# Patient Record
Sex: FEMALE | Race: WHITE | NOT HISPANIC OR LATINO
[De-identification: names, ages, dates, MRNs, and addresses within clinical notes are randomized per-mention and may not be internally consistent; named-entity substitution may affect disease eponyms.]

---

## 2017-01-03 ENCOUNTER — APPOINTMENT (OUTPATIENT)
Dept: FAMILY MEDICINE | Facility: CLINIC | Age: 67
End: 2017-01-03

## 2017-01-03 ENCOUNTER — OTHER (OUTPATIENT)
Age: 67
End: 2017-01-03

## 2017-01-10 ENCOUNTER — RESULT REVIEW (OUTPATIENT)
Age: 67
End: 2017-01-10

## 2017-01-10 LAB — FERRITIN SERPL-MCNC: 248 NG/ML

## 2017-03-08 ENCOUNTER — APPOINTMENT (OUTPATIENT)
Dept: FAMILY MEDICINE | Facility: CLINIC | Age: 67
End: 2017-03-08

## 2017-03-08 VITALS
BODY MASS INDEX: 27.6 KG/M2 | OXYGEN SATURATION: 98 % | WEIGHT: 150 LBS | SYSTOLIC BLOOD PRESSURE: 120 MMHG | HEIGHT: 62 IN | RESPIRATION RATE: 15 BRPM | TEMPERATURE: 98.9 F | DIASTOLIC BLOOD PRESSURE: 76 MMHG | HEART RATE: 74 BPM

## 2017-03-13 ENCOUNTER — APPOINTMENT (OUTPATIENT)
Dept: FAMILY MEDICINE | Facility: CLINIC | Age: 67
End: 2017-03-13

## 2017-03-13 VITALS
HEIGHT: 62 IN | RESPIRATION RATE: 20 BRPM | BODY MASS INDEX: 27.97 KG/M2 | HEART RATE: 76 BPM | SYSTOLIC BLOOD PRESSURE: 124 MMHG | DIASTOLIC BLOOD PRESSURE: 78 MMHG | WEIGHT: 152 LBS

## 2017-03-13 DIAGNOSIS — Z98.86 PERSONAL HISTORY OF BREAST IMPLANT REMOVAL: ICD-10-CM

## 2017-03-13 LAB
APPEARANCE: CLEAR
BACTERIA UR CULT: ABNORMAL
BACTERIA: ABNORMAL
BILIRUBIN URINE: NEGATIVE
BLOOD URINE: NEGATIVE
COLOR: YELLOW
GLUCOSE QUALITATIVE U: NORMAL MG/DL
HYALINE CASTS: 1 /LPF
KETONES URINE: NEGATIVE
LEUKOCYTE ESTERASE URINE: ABNORMAL
MICROSCOPIC-UA: NORMAL
NITRITE URINE: POSITIVE
PH URINE: 6
PROTEIN URINE: NEGATIVE MG/DL
RED BLOOD CELLS URINE: 2 /HPF
SPECIFIC GRAVITY URINE: 1.01
SQUAMOUS EPITHELIAL CELLS: 2 /HPF
UROBILINOGEN URINE: NORMAL MG/DL
WHITE BLOOD CELLS URINE: 6 /HPF

## 2017-03-13 RX ORDER — OXYCODONE AND ACETAMINOPHEN 5; 325 MG/1; MG/1
5-325 TABLET ORAL
Qty: 30 | Refills: 0 | Status: DISCONTINUED | COMMUNITY
Start: 2017-03-07

## 2017-03-13 RX ORDER — NITROFURANTOIN (MONOHYDRATE/MACROCRYSTALS) 25; 75 MG/1; MG/1
100 CAPSULE ORAL
Qty: 14 | Refills: 0 | Status: DISCONTINUED | COMMUNITY
Start: 2017-03-08 | End: 2017-03-13

## 2017-03-13 RX ORDER — CEPHALEXIN 500 MG/1
500 CAPSULE ORAL
Qty: 28 | Refills: 0 | Status: DISCONTINUED | COMMUNITY
Start: 2017-03-07

## 2017-03-13 RX ORDER — ONDANSETRON 4 MG/1
4 TABLET ORAL
Qty: 6 | Refills: 0 | Status: DISCONTINUED | COMMUNITY
Start: 2017-03-07

## 2017-03-16 LAB
ALBUMIN SERPL ELPH-MCNC: 4.2 G/DL
ALP BLD-CCNC: 50 U/L
ALT SERPL-CCNC: 8 U/L
ANION GAP SERPL CALC-SCNC: 20 MMOL/L
AST SERPL-CCNC: 16 U/L
BASOPHILS # BLD AUTO: 0.05 K/UL
BASOPHILS NFR BLD AUTO: 0.7 %
BILIRUB SERPL-MCNC: 0.2 MG/DL
BUN SERPL-MCNC: 14 MG/DL
CALCIUM SERPL-MCNC: 9.8 MG/DL
CHLORIDE SERPL-SCNC: 100 MMOL/L
CO2 SERPL-SCNC: 24 MMOL/L
CREAT SERPL-MCNC: 0.69 MG/DL
EOSINOPHIL # BLD AUTO: 0.2 K/UL
EOSINOPHIL NFR BLD AUTO: 3 %
GLUCOSE SERPL-MCNC: 92 MG/DL
HCT VFR BLD CALC: 40.3 %
HGB BLD-MCNC: 12.9 G/DL
IMM GRANULOCYTES NFR BLD AUTO: 0.3 %
LYMPHOCYTES # BLD AUTO: 1.75 K/UL
LYMPHOCYTES NFR BLD AUTO: 26.2 %
MAN DIFF?: NORMAL
MCHC RBC-ENTMCNC: 29.7 PG
MCHC RBC-ENTMCNC: 32 GM/DL
MCV RBC AUTO: 92.9 FL
MONOCYTES # BLD AUTO: 0.61 K/UL
MONOCYTES NFR BLD AUTO: 9.1 %
NEUTROPHILS # BLD AUTO: 4.06 K/UL
NEUTROPHILS NFR BLD AUTO: 60.7 %
PLATELET # BLD AUTO: 274 K/UL
POTASSIUM SERPL-SCNC: 4.7 MMOL/L
PROT SERPL-MCNC: 6.8 G/DL
RBC # BLD: 4.34 M/UL
RBC # FLD: 14 %
SODIUM SERPL-SCNC: 143 MMOL/L
WBC # FLD AUTO: 6.69 K/UL

## 2017-08-28 ENCOUNTER — APPOINTMENT (OUTPATIENT)
Dept: FAMILY MEDICINE | Facility: CLINIC | Age: 67
End: 2017-08-28
Payer: MEDICARE

## 2017-08-28 VITALS
HEIGHT: 62 IN | BODY MASS INDEX: 27.42 KG/M2 | RESPIRATION RATE: 20 BRPM | WEIGHT: 149 LBS | HEART RATE: 68 BPM | DIASTOLIC BLOOD PRESSURE: 65 MMHG | SYSTOLIC BLOOD PRESSURE: 122 MMHG

## 2017-08-28 PROCEDURE — 99213 OFFICE O/P EST LOW 20 MIN: CPT | Mod: 25

## 2017-08-28 PROCEDURE — 36415 COLL VENOUS BLD VENIPUNCTURE: CPT

## 2017-09-04 LAB
ALBUMIN SERPL ELPH-MCNC: 4.3 G/DL
ALP BLD-CCNC: 56 U/L
ALT SERPL-CCNC: 8 U/L
ANION GAP SERPL CALC-SCNC: 18 MMOL/L
APPEARANCE: CLEAR
AST SERPL-CCNC: 13 U/L
BASOPHILS # BLD AUTO: 0.04 K/UL
BASOPHILS NFR BLD AUTO: 0.6 %
BILIRUB SERPL-MCNC: 0.4 MG/DL
BILIRUBIN URINE: NEGATIVE
BLOOD URINE: NEGATIVE
BUN SERPL-MCNC: 9 MG/DL
CALCIUM SERPL-MCNC: 10.1 MG/DL
CHLORIDE SERPL-SCNC: 101 MMOL/L
CHOLEST SERPL-MCNC: 234 MG/DL
CHOLEST/HDLC SERPL: 2.8 RATIO
CO2 SERPL-SCNC: 23 MMOL/L
COLOR: YELLOW
CREAT SERPL-MCNC: 0.75 MG/DL
EOSINOPHIL # BLD AUTO: 0.29 K/UL
EOSINOPHIL NFR BLD AUTO: 4.1 %
FERRITIN SERPL-MCNC: 245 NG/ML
GLUCOSE QUALITATIVE U: NORMAL MG/DL
GLUCOSE SERPL-MCNC: 97 MG/DL
HBA1C MFR BLD HPLC: 5.6 %
HCT VFR BLD CALC: 40.9 %
HDLC SERPL-MCNC: 84 MG/DL
HGB BLD-MCNC: 13 G/DL
IMM GRANULOCYTES NFR BLD AUTO: 0.1 %
KETONES URINE: NEGATIVE
LDLC SERPL CALC-MCNC: 126 MG/DL
LEUKOCYTE ESTERASE URINE: NEGATIVE
LYMPHOCYTES # BLD AUTO: 2.11 K/UL
LYMPHOCYTES NFR BLD AUTO: 30.1 %
MAN DIFF?: NORMAL
MCHC RBC-ENTMCNC: 29.5 PG
MCHC RBC-ENTMCNC: 31.8 GM/DL
MCV RBC AUTO: 93 FL
MONOCYTES # BLD AUTO: 0.6 K/UL
MONOCYTES NFR BLD AUTO: 8.5 %
NEUTROPHILS # BLD AUTO: 3.97 K/UL
NEUTROPHILS NFR BLD AUTO: 56.6 %
NITRITE URINE: NEGATIVE
PH URINE: 6
PLATELET # BLD AUTO: 273 K/UL
POTASSIUM SERPL-SCNC: 4.8 MMOL/L
PROT SERPL-MCNC: 7.2 G/DL
PROTEIN URINE: NEGATIVE MG/DL
RBC # BLD: 4.4 M/UL
RBC # FLD: 14.1 %
SODIUM SERPL-SCNC: 142 MMOL/L
SPECIFIC GRAVITY URINE: 1.01
T4 FREE SERPL-MCNC: 1.3 NG/DL
TRIGL SERPL-MCNC: 118 MG/DL
TSH SERPL-ACNC: 3.57 UIU/ML
UROBILINOGEN URINE: NORMAL MG/DL
WBC # FLD AUTO: 7.02 K/UL

## 2017-09-05 ENCOUNTER — RESULT REVIEW (OUTPATIENT)
Age: 67
End: 2017-09-05

## 2017-09-07 ENCOUNTER — RESULT REVIEW (OUTPATIENT)
Age: 67
End: 2017-09-07

## 2017-09-19 ENCOUNTER — APPOINTMENT (OUTPATIENT)
Dept: HEMATOLOGY ONCOLOGY | Facility: CLINIC | Age: 67
End: 2017-09-19
Payer: MEDICARE

## 2017-09-19 VITALS
HEART RATE: 68 BPM | TEMPERATURE: 98.6 F | WEIGHT: 150 LBS | HEIGHT: 61.5 IN | SYSTOLIC BLOOD PRESSURE: 128 MMHG | BODY MASS INDEX: 27.96 KG/M2 | DIASTOLIC BLOOD PRESSURE: 70 MMHG

## 2017-09-19 DIAGNOSIS — N39.0 URINARY TRACT INFECTION, SITE NOT SPECIFIED: ICD-10-CM

## 2017-09-19 DIAGNOSIS — Z87.81 PERSONAL HISTORY OF (HEALED) TRAUMATIC FRACTURE: ICD-10-CM

## 2017-09-19 DIAGNOSIS — Z87.898 PERSONAL HISTORY OF OTHER SPECIFIED CONDITIONS: ICD-10-CM

## 2017-09-19 DIAGNOSIS — Z98.890 OTHER SPECIFIED POSTPROCEDURAL STATES: ICD-10-CM

## 2017-09-19 DIAGNOSIS — Z78.9 OTHER SPECIFIED HEALTH STATUS: ICD-10-CM

## 2017-09-19 DIAGNOSIS — Z87.09 PERSONAL HISTORY OF OTHER DISEASES OF THE RESPIRATORY SYSTEM: ICD-10-CM

## 2017-09-19 PROCEDURE — 99205 OFFICE O/P NEW HI 60 MIN: CPT

## 2017-09-19 RX ORDER — BACILLUS COAGULANS/INULIN 1B-250 MG
CAPSULE ORAL
Refills: 0 | Status: ACTIVE | COMMUNITY

## 2017-09-19 RX ORDER — ASCORBIC ACID 500 MG
TABLET ORAL
Refills: 0 | Status: ACTIVE | COMMUNITY

## 2017-09-19 RX ORDER — BIOTIN 10 MG
TABLET ORAL
Refills: 0 | Status: ACTIVE | COMMUNITY

## 2017-09-21 ENCOUNTER — FORM ENCOUNTER (OUTPATIENT)
Age: 67
End: 2017-09-21

## 2017-09-22 ENCOUNTER — APPOINTMENT (OUTPATIENT)
Dept: ULTRASOUND IMAGING | Facility: HOSPITAL | Age: 67
End: 2017-09-22
Payer: MEDICARE

## 2017-09-22 ENCOUNTER — APPOINTMENT (OUTPATIENT)
Dept: RADIOLOGY | Facility: HOSPITAL | Age: 67
End: 2017-09-22
Payer: MEDICARE

## 2017-09-22 ENCOUNTER — OUTPATIENT (OUTPATIENT)
Dept: OUTPATIENT SERVICES | Facility: HOSPITAL | Age: 67
LOS: 1 days | End: 2017-09-22
Payer: MEDICARE

## 2017-09-22 LAB
ALBUMIN SERPL ELPH-MCNC: 4.2 G/DL
ALP BLD-CCNC: 53 U/L
ALT SERPL-CCNC: 11 U/L
APPEARANCE: CLEAR
AST SERPL-CCNC: 15 U/L
BASOPHILS # BLD AUTO: 0.04 K/UL
BASOPHILS NFR BLD AUTO: 0.7 %
BILIRUB DIRECT SERPL-MCNC: 0.1 MG/DL
BILIRUB INDIRECT SERPL-MCNC: 0.3 MG/DL
BILIRUB SERPL-MCNC: 0.4 MG/DL
BILIRUBIN URINE: NEGATIVE
BLOOD URINE: NEGATIVE
COLOR: YELLOW
CRP SERPL-MCNC: 1.1 MG/DL
EOSINOPHIL # BLD AUTO: 0.3 K/UL
EOSINOPHIL NFR BLD AUTO: 5.1 %
ERYTHROCYTE [SEDIMENTATION RATE] IN BLOOD BY WESTERGREN METHOD: 16 MM/HR
FERRITIN SERPL-MCNC: 260 NG/ML
GLUCOSE QUALITATIVE U: NORMAL MG/DL
HCT VFR BLD CALC: 38.5 %
HGB BLD-MCNC: 12.7 G/DL
IMM GRANULOCYTES NFR BLD AUTO: 0.3 %
IRON SATN MFR SERPL: 32 %
IRON SERPL-MCNC: 98 UG/DL
KETONES URINE: ABNORMAL
LEUKOCYTE ESTERASE URINE: NEGATIVE
LYMPHOCYTES # BLD AUTO: 1.76 K/UL
LYMPHOCYTES NFR BLD AUTO: 30 %
MAN DIFF?: NORMAL
MCHC RBC-ENTMCNC: 30.1 PG
MCHC RBC-ENTMCNC: 33 GM/DL
MCV RBC AUTO: 91.2 FL
MONOCYTES # BLD AUTO: 0.58 K/UL
MONOCYTES NFR BLD AUTO: 9.9 %
NEUTROPHILS # BLD AUTO: 3.17 K/UL
NEUTROPHILS NFR BLD AUTO: 54 %
NITRITE URINE: NEGATIVE
PH URINE: 6
PLATELET # BLD AUTO: 280 K/UL
PROT SERPL-MCNC: 7.5 G/DL
PROTEIN URINE: NEGATIVE MG/DL
RBC # BLD: 4.22 M/UL
RBC # FLD: 14.1 %
SPECIFIC GRAVITY URINE: 1.01
TIBC SERPL-MCNC: 310 UG/DL
UIBC SERPL-MCNC: 212 UG/DL
UROBILINOGEN URINE: NORMAL MG/DL
WBC # FLD AUTO: 5.87 K/UL

## 2017-09-22 PROCEDURE — 76700 US EXAM ABDOM COMPLETE: CPT | Mod: 26

## 2017-09-22 PROCEDURE — 76700 US EXAM ABDOM COMPLETE: CPT

## 2017-09-22 PROCEDURE — 71020: CPT | Mod: 26

## 2017-09-22 PROCEDURE — 71046 X-RAY EXAM CHEST 2 VIEWS: CPT

## 2017-09-23 ENCOUNTER — RESULT REVIEW (OUTPATIENT)
Age: 67
End: 2017-09-23

## 2017-09-23 LAB
ALBUMIN MFR SERPL ELPH: 57 %
ALBUMIN SERPL-MCNC: 4.3 G/DL
ALBUMIN/GLOB SERPL: 1.3 RATIO
ALPHA1 GLOB MFR SERPL ELPH: 4.7 %
ALPHA1 GLOB SERPL ELPH-MCNC: 0.4 G/DL
ALPHA2 GLOB MFR SERPL ELPH: 11.5 %
ALPHA2 GLOB SERPL ELPH-MCNC: 0.9 G/DL
B-GLOBULIN MFR SERPL ELPH: 11.7 %
B-GLOBULIN SERPL ELPH-MCNC: 0.9 G/DL
GAMMA GLOB FLD ELPH-MCNC: 1.1 G/DL
GAMMA GLOB MFR SERPL ELPH: 15.1 %
INTERPRETATION SERPL IEP-IMP: NORMAL
PROT SERPL-MCNC: 7.5 G/DL
PROT SERPL-MCNC: 7.5 G/DL

## 2017-09-27 ENCOUNTER — MESSAGE (OUTPATIENT)
Age: 67
End: 2017-09-27

## 2017-09-27 LAB
BACTERIA BLD CULT: NORMAL
TM INTERPRETATION: NORMAL

## 2017-10-17 ENCOUNTER — APPOINTMENT (OUTPATIENT)
Dept: HEMATOLOGY ONCOLOGY | Facility: CLINIC | Age: 67
End: 2017-10-17
Payer: MEDICARE

## 2017-10-17 VITALS
BODY MASS INDEX: 27.88 KG/M2 | DIASTOLIC BLOOD PRESSURE: 64 MMHG | HEART RATE: 68 BPM | SYSTOLIC BLOOD PRESSURE: 128 MMHG | RESPIRATION RATE: 18 BRPM | TEMPERATURE: 98.4 F | WEIGHT: 150 LBS

## 2017-10-17 PROCEDURE — 99214 OFFICE O/P EST MOD 30 MIN: CPT

## 2017-12-08 ENCOUNTER — OTHER (OUTPATIENT)
Age: 67
End: 2017-12-08

## 2018-01-16 ENCOUNTER — RESULT REVIEW (OUTPATIENT)
Age: 68
End: 2018-01-16

## 2018-01-16 LAB — HEMOCCULT STL QL IA: NEGATIVE

## 2018-02-01 ENCOUNTER — APPOINTMENT (OUTPATIENT)
Dept: FAMILY MEDICINE | Facility: CLINIC | Age: 68
End: 2018-02-01
Payer: MEDICARE

## 2018-02-01 VITALS
HEART RATE: 64 BPM | WEIGHT: 151 LBS | RESPIRATION RATE: 20 BRPM | DIASTOLIC BLOOD PRESSURE: 68 MMHG | BODY MASS INDEX: 28.14 KG/M2 | HEIGHT: 61.5 IN | SYSTOLIC BLOOD PRESSURE: 125 MMHG

## 2018-02-01 PROCEDURE — G0439: CPT

## 2018-02-01 PROCEDURE — 93000 ELECTROCARDIOGRAM COMPLETE: CPT | Mod: 59

## 2018-02-01 PROCEDURE — 36415 COLL VENOUS BLD VENIPUNCTURE: CPT

## 2018-02-02 LAB
ALBUMIN SERPL ELPH-MCNC: 4.1 G/DL
ALP BLD-CCNC: 57 U/L
ALT SERPL-CCNC: 13 U/L
ANION GAP SERPL CALC-SCNC: 11 MMOL/L
APPEARANCE: CLEAR
AST SERPL-CCNC: 15 U/L
BASOPHILS # BLD AUTO: 0.05 K/UL
BASOPHILS NFR BLD AUTO: 0.8 %
BILIRUB SERPL-MCNC: 0.4 MG/DL
BILIRUBIN URINE: NEGATIVE
BLOOD URINE: NEGATIVE
BUN SERPL-MCNC: 14 MG/DL
CALCIUM SERPL-MCNC: 9.6 MG/DL
CHLORIDE SERPL-SCNC: 103 MMOL/L
CHOLEST SERPL-MCNC: 207 MG/DL
CHOLEST/HDLC SERPL: 2.7 RATIO
CO2 SERPL-SCNC: 25 MMOL/L
COLOR: YELLOW
CREAT SERPL-MCNC: 0.75 MG/DL
EOSINOPHIL # BLD AUTO: 0.41 K/UL
EOSINOPHIL NFR BLD AUTO: 6.4 %
FERRITIN SERPL-MCNC: 127 NG/ML
GLUCOSE QUALITATIVE U: NEGATIVE MG/DL
GLUCOSE SERPL-MCNC: 96 MG/DL
HBA1C MFR BLD HPLC: 5.5 %
HCT VFR BLD CALC: 39.1 %
HCV AB SER QL: NONREACTIVE
HCV S/CO RATIO: 0.14 S/CO
HDLC SERPL-MCNC: 76 MG/DL
HGB BLD-MCNC: 12.7 G/DL
IMM GRANULOCYTES NFR BLD AUTO: 0.5 %
IRON SATN MFR SERPL: 30 %
IRON SERPL-MCNC: 91 UG/DL
KETONES URINE: NEGATIVE
LDLC SERPL CALC-MCNC: 110 MG/DL
LEUKOCYTE ESTERASE URINE: NEGATIVE
LYMPHOCYTES # BLD AUTO: 1.59 K/UL
LYMPHOCYTES NFR BLD AUTO: 25 %
MAN DIFF?: NORMAL
MCHC RBC-ENTMCNC: 29 PG
MCHC RBC-ENTMCNC: 32.5 GM/DL
MCV RBC AUTO: 89.3 FL
MONOCYTES # BLD AUTO: 0.77 K/UL
MONOCYTES NFR BLD AUTO: 12.1 %
NEUTROPHILS # BLD AUTO: 3.51 K/UL
NEUTROPHILS NFR BLD AUTO: 55.2 %
NITRITE URINE: NEGATIVE
PH URINE: 6
PLATELET # BLD AUTO: 327 K/UL
POTASSIUM SERPL-SCNC: 4.6 MMOL/L
PROT SERPL-MCNC: 7 G/DL
PROTEIN URINE: NEGATIVE MG/DL
RBC # BLD: 4.38 M/UL
RBC # FLD: 13.2 %
RHEUMATOID FACT SER QL: 9 IU/ML
SODIUM SERPL-SCNC: 139 MMOL/L
SPECIFIC GRAVITY URINE: 1.01
T4 FREE SERPL-MCNC: 1.2 NG/DL
TIBC SERPL-MCNC: 307 UG/DL
TRIGL SERPL-MCNC: 105 MG/DL
TSH SERPL-ACNC: 2.99 UIU/ML
UIBC SERPL-MCNC: 216 UG/DL
UROBILINOGEN URINE: NEGATIVE MG/DL
WBC # FLD AUTO: 6.36 K/UL

## 2018-02-05 LAB
ANA PAT FLD IF-IMP: ABNORMAL
ANA SER IF-ACNC: ABNORMAL

## 2018-04-11 LAB
ANA PAT FLD IF-IMP: ABNORMAL
ANA SER IF-ACNC: ABNORMAL

## 2018-04-17 ENCOUNTER — APPOINTMENT (OUTPATIENT)
Dept: HEMATOLOGY ONCOLOGY | Facility: CLINIC | Age: 68
End: 2018-04-17
Payer: MEDICARE

## 2018-04-17 VITALS
DIASTOLIC BLOOD PRESSURE: 80 MMHG | BODY MASS INDEX: 28.44 KG/M2 | WEIGHT: 153 LBS | HEART RATE: 76 BPM | SYSTOLIC BLOOD PRESSURE: 124 MMHG

## 2018-04-17 DIAGNOSIS — R76.8 OTHER SPECIFIED ABNORMAL IMMUNOLOGICAL FINDINGS IN SERUM: ICD-10-CM

## 2018-04-17 DIAGNOSIS — Z98.890 OTHER SPECIFIED POSTPROCEDURAL STATES: ICD-10-CM

## 2018-04-17 DIAGNOSIS — Z92.89 PERSONAL HISTORY OF OTHER MEDICAL TREATMENT: ICD-10-CM

## 2018-04-17 DIAGNOSIS — R79.89 OTHER SPECIFIED ABNORMAL FINDINGS OF BLOOD CHEMISTRY: ICD-10-CM

## 2018-04-17 PROCEDURE — 99214 OFFICE O/P EST MOD 30 MIN: CPT

## 2019-03-19 ENCOUNTER — APPOINTMENT (OUTPATIENT)
Dept: FAMILY MEDICINE | Facility: CLINIC | Age: 69
End: 2019-03-19
Payer: MEDICARE

## 2019-03-19 ENCOUNTER — LABORATORY RESULT (OUTPATIENT)
Age: 69
End: 2019-03-19

## 2019-03-19 VITALS
BODY MASS INDEX: 27.4 KG/M2 | RESPIRATION RATE: 20 BRPM | DIASTOLIC BLOOD PRESSURE: 70 MMHG | HEART RATE: 76 BPM | SYSTOLIC BLOOD PRESSURE: 115 MMHG | HEIGHT: 61.5 IN | WEIGHT: 147 LBS

## 2019-03-19 PROCEDURE — 36415 COLL VENOUS BLD VENIPUNCTURE: CPT

## 2019-03-19 PROCEDURE — 99397 PER PM REEVAL EST PAT 65+ YR: CPT | Mod: 25

## 2019-03-19 PROCEDURE — 93000 ELECTROCARDIOGRAM COMPLETE: CPT | Mod: 59

## 2019-03-19 NOTE — ASSESSMENT
[FreeTextEntry1] : Comprehensive exam reveals patient to be hemodynamically stable with acceptable BP\par No new issues identified\par Lab profiles sent

## 2019-03-19 NOTE — PHYSICAL EXAM
[No Acute Distress] : no acute distress [Well Nourished] : well nourished [Well Developed] : well developed [Well-Appearing] : well-appearing [Normal Sclera/Conjunctiva] : normal sclera/conjunctiva [PERRL] : pupils equal round and reactive to light [EOMI] : extraocular movements intact [Normal Outer Ear/Nose] : the outer ears and nose were normal in appearance [Normal Oropharynx] : the oropharynx was normal [Normal TMs] : both tympanic membranes were normal [Normal Nasal Mucosa] : the nasal mucosa was normal [No JVD] : no jugular venous distention [Supple] : supple [No Lymphadenopathy] : no lymphadenopathy [Thyroid Normal, No Nodules] : the thyroid was normal and there were no nodules present [No Respiratory Distress] : no respiratory distress  [Clear to Auscultation] : lungs were clear to auscultation bilaterally [Normal Rate] : normal rate  [No Accessory Muscle Use] : no accessory muscle use [Normal S1, S2] : normal S1 and S2 [Regular Rhythm] : with a regular rhythm [No Murmur] : no murmur heard [No Carotid Bruits] : no carotid bruits [No Abdominal Bruit] : a ~M bruit was not heard ~T in the abdomen [No Varicosities] : no varicosities [No Edema] : there was no peripheral edema [Pedal Pulses Present] : the pedal pulses are present [No Extremity Clubbing/Cyanosis] : no extremity clubbing/cyanosis [No Palpable Aorta] : no palpable aorta [Soft] : abdomen soft [Non Tender] : non-tender [Non-distended] : non-distended [No Masses] : no abdominal mass palpated [No HSM] : no HSM [Normal Bowel Sounds] : normal bowel sounds [Normal Posterior Cervical Nodes] : no posterior cervical lymphadenopathy [Normal Femoral Nodes] : no femoral lymphadenopathy [Normal Anterior Cervical Nodes] : no anterior cervical lymphadenopathy [Normal Inguinal Nodes] : no inguinal lymphadenopathy [No CVA Tenderness] : no CVA  tenderness [No Spinal Tenderness] : no spinal tenderness [Grossly Normal Strength/Tone] : grossly normal strength/tone [No Rash] : no rash [No Skin Lesions] : no skin lesions [Normal Gait] : normal gait [Coordination Grossly Intact] : coordination grossly intact [No Focal Deficits] : no focal deficits [Speech Grossly Normal] : speech grossly normal [Memory Grossly Normal] : memory grossly normal [Normal Affect] : the affect was normal [Normal Mood] : the mood was normal [Alert and Oriented x3] : oriented to person, place, and time [Normal Insight/Judgement] : insight and judgment were intact [de-identified] : mild congestion noted [de-identified] : defer to GYN [FreeTextEntry1] : defer to GYN [de-identified] : defer to GYN [de-identified] : multiple OA nodules noted

## 2019-03-19 NOTE — HEALTH RISK ASSESSMENT
[No falls in past year] : Patient reported no falls in the past year [0] : 2) Feeling down, depressed, or hopeless: Not at all (0) [Patient reported mammogram was normal] : Patient reported mammogram was normal [Patient reported PAP Smear was normal] : Patient reported PAP Smear was normal [Patient reported colonoscopy was normal] : Patient reported colonoscopy was normal [Fully functional (bathing, dressing, toileting, transferring, walking, feeding)] : Fully functional (bathing, dressing, toileting, transferring, walking, feeding) [Fully functional (using the telephone, shopping, preparing meals, housekeeping, doing laundry, using] : Fully functional and needs no help or supervision to perform IADLs (using the telephone, shopping, preparing meals, housekeeping, doing laundry, using transportation, managing medications and managing finances) [Reviewed no changes] : Reviewed no changes [] : No [MammogramDate] : 04/18 [PapSmearDate] : 04/18 [ColonoscopyDate] : 12/18 [AdvancecareDate] : 03/19

## 2019-03-19 NOTE — HISTORY OF PRESENT ILLNESS
[FreeTextEntry1] : Requests comprehensive exam\par  [de-identified] : Presents for comprehensive exam.  States feeling generally well; trying to watch diet and remain active.  Reviewed screening and immunizations - note declines vaccines.  Continues to follow with Rheum for arthropathy.

## 2019-03-20 LAB
ALBUMIN SERPL ELPH-MCNC: 4.4 G/DL
ALP BLD-CCNC: 48 U/L
ALT SERPL-CCNC: 9 U/L
ANION GAP SERPL CALC-SCNC: 12 MMOL/L
APPEARANCE: ABNORMAL
AST SERPL-CCNC: 14 U/L
BASOPHILS # BLD AUTO: 0.05 K/UL
BASOPHILS NFR BLD AUTO: 0.8 %
BILIRUB SERPL-MCNC: 0.4 MG/DL
BILIRUBIN URINE: NEGATIVE
BLOOD URINE: NEGATIVE
BUN SERPL-MCNC: 11 MG/DL
CALCIUM SERPL-MCNC: 9.6 MG/DL
CHLORIDE SERPL-SCNC: 104 MMOL/L
CHOLEST SERPL-MCNC: 207 MG/DL
CHOLEST/HDLC SERPL: 2.7 RATIO
CO2 SERPL-SCNC: 23 MMOL/L
COLOR: YELLOW
CREAT SERPL-MCNC: 0.69 MG/DL
EOSINOPHIL # BLD AUTO: 0.24 K/UL
EOSINOPHIL NFR BLD AUTO: 3.9 %
FERRITIN SERPL-MCNC: 166 NG/ML
FOLATE SERPL-MCNC: >20 NG/ML
GLUCOSE QUALITATIVE U: NEGATIVE
GLUCOSE SERPL-MCNC: 94 MG/DL
HBA1C MFR BLD HPLC: 5.5 %
HCT VFR BLD CALC: 40.4 %
HDLC SERPL-MCNC: 78 MG/DL
HGB BLD-MCNC: 12.8 G/DL
IMM GRANULOCYTES NFR BLD AUTO: 0.3 %
KETONES URINE: NORMAL
LDLC SERPL CALC-MCNC: 106 MG/DL
LEUKOCYTE ESTERASE URINE: NEGATIVE
LYMPHOCYTES # BLD AUTO: 1.24 K/UL
LYMPHOCYTES NFR BLD AUTO: 20.2 %
MAN DIFF?: NORMAL
MCHC RBC-ENTMCNC: 29.4 PG
MCHC RBC-ENTMCNC: 31.7 GM/DL
MCV RBC AUTO: 92.9 FL
MONOCYTES # BLD AUTO: 0.52 K/UL
MONOCYTES NFR BLD AUTO: 8.5 %
NEUTROPHILS # BLD AUTO: 4.08 K/UL
NEUTROPHILS NFR BLD AUTO: 66.3 %
NITRITE URINE: NEGATIVE
PH URINE: 5.5
PLATELET # BLD AUTO: 276 K/UL
POTASSIUM SERPL-SCNC: 4.8 MMOL/L
PROT SERPL-MCNC: 6.9 G/DL
PROTEIN URINE: NEGATIVE
RBC # BLD: 4.35 M/UL
RBC # FLD: 13.6 %
SODIUM SERPL-SCNC: 139 MMOL/L
SPECIFIC GRAVITY URINE: 1.02
T4 FREE SERPL-MCNC: 1.3 NG/DL
TRIGL SERPL-MCNC: 113 MG/DL
TSH SERPL-ACNC: 2.77 UIU/ML
UROBILINOGEN URINE: NORMAL
VIT B12 SERPL-MCNC: 569 PG/ML
WBC # FLD AUTO: 6.15 K/UL

## 2020-02-07 ENCOUNTER — APPOINTMENT (OUTPATIENT)
Dept: FAMILY MEDICINE | Facility: CLINIC | Age: 70
End: 2020-02-07
Payer: MEDICARE

## 2020-02-07 VITALS
HEIGHT: 61.5 IN | TEMPERATURE: 97.8 F | HEART RATE: 59 BPM | WEIGHT: 155 LBS | DIASTOLIC BLOOD PRESSURE: 60 MMHG | BODY MASS INDEX: 28.89 KG/M2 | SYSTOLIC BLOOD PRESSURE: 120 MMHG | OXYGEN SATURATION: 95 %

## 2020-02-07 DIAGNOSIS — Z87.09 PERSONAL HISTORY OF OTHER DISEASES OF THE RESPIRATORY SYSTEM: ICD-10-CM

## 2020-02-07 PROCEDURE — 99213 OFFICE O/P EST LOW 20 MIN: CPT

## 2020-02-07 RX ORDER — AMOXICILLIN AND CLAVULANATE POTASSIUM 875; 125 MG/1; MG/1
875-125 TABLET, COATED ORAL
Qty: 14 | Refills: 0 | Status: COMPLETED | COMMUNITY
Start: 2020-02-03 | End: 2020-02-07

## 2020-02-07 NOTE — PHYSICAL EXAM
[Normal] : affect was normal and insight and judgment were intact [de-identified] : Grossly clear breath sounds

## 2020-02-07 NOTE — REVIEW OF SYSTEMS
[Fatigue] : fatigue [Wheezing] : wheezing [Cough] : cough [Dyspnea on Exertion] : dyspnea on exertion [Negative] : Psychiatric

## 2020-02-07 NOTE — HISTORY OF PRESENT ILLNESS
[de-identified] : Dry hacking cough since this past Sunday. On Sunday and Monday she did note a significant fever (102) and fatigue that resolved. She notes mild Spencer, wheezing and chest pain with cough. Overall all symptoms are improving. She was prescribed course of Augementin by PCP Dr. Bautista and she started this Tuesday \par \par Has taken Dayquil, Nyquil, and Advil with mild relief of symptoms. \par \par Denies flu shot. \par Denies sob or \par \par \par Has developed bronchitis

## 2020-03-24 ENCOUNTER — APPOINTMENT (OUTPATIENT)
Dept: FAMILY MEDICINE | Facility: CLINIC | Age: 70
End: 2020-03-24

## 2020-05-05 ENCOUNTER — APPOINTMENT (OUTPATIENT)
Dept: FAMILY MEDICINE | Facility: CLINIC | Age: 70
End: 2020-05-05
Payer: MEDICARE

## 2020-05-05 VITALS
WEIGHT: 158 LBS | HEART RATE: 68 BPM | HEIGHT: 61.5 IN | BODY MASS INDEX: 29.45 KG/M2 | SYSTOLIC BLOOD PRESSURE: 118 MMHG | RESPIRATION RATE: 20 BRPM | DIASTOLIC BLOOD PRESSURE: 70 MMHG

## 2020-05-05 PROCEDURE — G0439: CPT | Mod: 25

## 2020-05-05 PROCEDURE — 93000 ELECTROCARDIOGRAM COMPLETE: CPT

## 2020-05-05 PROCEDURE — 36415 COLL VENOUS BLD VENIPUNCTURE: CPT

## 2020-05-05 NOTE — ASSESSMENT
[FreeTextEntry1] : Comprehensive exam reveals patient to be hemodynamically stable with acceptable BP\par No new issues identified\par Lab profiles drawn in office and sent

## 2020-05-05 NOTE — PHYSICAL EXAM
[Normal Anterior Cervical Nodes] : no anterior cervical lymphadenopathy [Normal Posterior Cervical Nodes] : no posterior cervical lymphadenopathy [Normal Inguinal Nodes] : no inguinal lymphadenopathy [Normal Femoral Nodes] : no femoral lymphadenopathy [Coordination Grossly Intact] : coordination grossly intact [Normal] : no rash [No Focal Deficits] : no focal deficits [Normal Gait] : normal gait [Speech Grossly Normal] : speech grossly normal [Memory Grossly Normal] : memory grossly normal [Alert and Oriented x3] : oriented to person, place, and time [Normal Affect] : the affect was normal [Normal Mood] : the mood was normal [Normal Insight/Judgement] : insight and judgment were intact [FreeTextEntry1] : defer to GYN [de-identified] : defer to GYN [de-identified] : defer to GYN

## 2020-05-05 NOTE — COUNSELING
[de-identified] : Healthy eating and activities [None] : None [Good understanding] : Patient has a good understanding of lifestyle changes and steps needed to achieve self management goal

## 2020-05-05 NOTE — HEALTH RISK ASSESSMENT
[Never (0 pts)] : Never (0 points) [No] : In the past 12 months have you used drugs other than those required for medical reasons? No [No falls in past year] : Patient reported no falls in the past year [0] : 2) Feeling down, depressed, or hopeless: Not at all (0) [Patient reported mammogram was normal] : Patient reported mammogram was normal [Fully functional (bathing, dressing, toileting, transferring, walking, feeding)] : Fully functional (bathing, dressing, toileting, transferring, walking, feeding) [Fully functional (using the telephone, shopping, preparing meals, housekeeping, doing laundry, using] : Fully functional and needs no help or supervision to perform IADLs (using the telephone, shopping, preparing meals, housekeeping, doing laundry, using transportation, managing medications and managing finances) [Reviewed no changes] : Reviewed no changes [] : No [Audit-CScore] : 0 [MammogramDate] : 06/19 [AdvancecareDate] : 05/20

## 2020-05-05 NOTE — HISTORY OF PRESENT ILLNESS
[de-identified] : Presents for comprehensive exam.  States feeling generally well; trying to watch diet and states walks 3 miles daily.  Reviewed screening and immunizations - note has consistently declined flu vaccine - did advise that considering the COVID-19 issues would strongly advise consenting to this year's flu vaccine when it becomes available. [FreeTextEntry1] : Requests comprehensive exam

## 2020-05-06 LAB
ALBUMIN SERPL ELPH-MCNC: 4.4 G/DL
ALP BLD-CCNC: 49 U/L
ALT SERPL-CCNC: 10 U/L
ANION GAP SERPL CALC-SCNC: 15 MMOL/L
APPEARANCE: CLEAR
AST SERPL-CCNC: 16 U/L
BASOPHILS # BLD AUTO: 0.06 K/UL
BASOPHILS NFR BLD AUTO: 1 %
BILIRUB SERPL-MCNC: 0.4 MG/DL
BILIRUBIN URINE: NEGATIVE
BLOOD URINE: NEGATIVE
BUN SERPL-MCNC: 13 MG/DL
CALCIUM SERPL-MCNC: 9.5 MG/DL
CHLORIDE SERPL-SCNC: 103 MMOL/L
CHOLEST SERPL-MCNC: 222 MG/DL
CHOLEST/HDLC SERPL: 2.7 RATIO
CO2 SERPL-SCNC: 22 MMOL/L
COLOR: YELLOW
CREAT SERPL-MCNC: 0.77 MG/DL
EOSINOPHIL # BLD AUTO: 0.24 K/UL
EOSINOPHIL NFR BLD AUTO: 3.9 %
FERRITIN SERPL-MCNC: 180 NG/ML
FOLATE SERPL-MCNC: >20 NG/ML
GLUCOSE QUALITATIVE U: NEGATIVE
GLUCOSE SERPL-MCNC: 97 MG/DL
HCT VFR BLD CALC: 39.6 %
HDLC SERPL-MCNC: 81 MG/DL
HGB BLD-MCNC: 12.4 G/DL
IMM GRANULOCYTES NFR BLD AUTO: 0.3 %
KETONES URINE: NORMAL
LDLC SERPL CALC-MCNC: 110 MG/DL
LEUKOCYTE ESTERASE URINE: NEGATIVE
LYMPHOCYTES # BLD AUTO: 1.37 K/UL
LYMPHOCYTES NFR BLD AUTO: 22.3 %
MAN DIFF?: NORMAL
MCHC RBC-ENTMCNC: 29.4 PG
MCHC RBC-ENTMCNC: 31.3 GM/DL
MCV RBC AUTO: 93.8 FL
MONOCYTES # BLD AUTO: 0.53 K/UL
MONOCYTES NFR BLD AUTO: 8.6 %
NEUTROPHILS # BLD AUTO: 3.92 K/UL
NEUTROPHILS NFR BLD AUTO: 63.9 %
NITRITE URINE: NEGATIVE
PH URINE: 5
PLATELET # BLD AUTO: 263 K/UL
POTASSIUM SERPL-SCNC: 4.4 MMOL/L
PROT SERPL-MCNC: 6.8 G/DL
PROTEIN URINE: NEGATIVE
RBC # BLD: 4.22 M/UL
RBC # FLD: 13.7 %
SODIUM SERPL-SCNC: 139 MMOL/L
SPECIFIC GRAVITY URINE: 1.02
T4 FREE SERPL-MCNC: 1.2 NG/DL
TRIGL SERPL-MCNC: 154 MG/DL
TSH SERPL-ACNC: 3.77 UIU/ML
UROBILINOGEN URINE: NORMAL
VIT B12 SERPL-MCNC: 483 PG/ML
WBC # FLD AUTO: 6.14 K/UL

## 2020-11-03 ENCOUNTER — APPOINTMENT (OUTPATIENT)
Dept: MAMMOGRAPHY | Facility: HOSPITAL | Age: 70
End: 2020-11-03
Payer: MEDICARE

## 2020-11-03 ENCOUNTER — OUTPATIENT (OUTPATIENT)
Dept: OUTPATIENT SERVICES | Facility: HOSPITAL | Age: 70
LOS: 1 days | End: 2020-11-03
Payer: MEDICARE

## 2020-11-03 ENCOUNTER — APPOINTMENT (OUTPATIENT)
Dept: ULTRASOUND IMAGING | Facility: HOSPITAL | Age: 70
End: 2020-11-03
Payer: MEDICARE

## 2020-11-03 ENCOUNTER — APPOINTMENT (OUTPATIENT)
Dept: RADIOLOGY | Facility: HOSPITAL | Age: 70
End: 2020-11-03
Payer: MEDICARE

## 2020-11-03 DIAGNOSIS — Z00.8 ENCOUNTER FOR OTHER GENERAL EXAMINATION: ICD-10-CM

## 2020-11-03 DIAGNOSIS — R92.8 OTHER ABNORMAL AND INCONCLUSIVE FINDINGS ON DIAGNOSTIC IMAGING OF BREAST: ICD-10-CM

## 2020-11-03 DIAGNOSIS — Z12.31 ENCOUNTER FOR SCREENING MAMMOGRAM FOR MALIGNANT NEOPLASM OF BREAST: ICD-10-CM

## 2020-11-03 PROCEDURE — 77063 BREAST TOMOSYNTHESIS BI: CPT | Mod: 26

## 2020-11-03 PROCEDURE — 76641 ULTRASOUND BREAST COMPLETE: CPT | Mod: 26,50

## 2020-11-03 PROCEDURE — 77080 DXA BONE DENSITY AXIAL: CPT | Mod: 26

## 2020-11-03 PROCEDURE — 77067 SCR MAMMO BI INCL CAD: CPT | Mod: 26

## 2020-11-03 PROCEDURE — 77063 BREAST TOMOSYNTHESIS BI: CPT

## 2020-11-03 PROCEDURE — 77067 SCR MAMMO BI INCL CAD: CPT

## 2020-11-03 PROCEDURE — 76641 ULTRASOUND BREAST COMPLETE: CPT

## 2020-11-03 PROCEDURE — 77080 DXA BONE DENSITY AXIAL: CPT

## 2020-12-11 ENCOUNTER — APPOINTMENT (OUTPATIENT)
Dept: FAMILY MEDICINE | Facility: CLINIC | Age: 70
End: 2020-12-11
Payer: MEDICARE

## 2020-12-11 VITALS
DIASTOLIC BLOOD PRESSURE: 70 MMHG | BODY MASS INDEX: 29.26 KG/M2 | SYSTOLIC BLOOD PRESSURE: 122 MMHG | RESPIRATION RATE: 20 BRPM | WEIGHT: 157 LBS | HEART RATE: 68 BPM | HEIGHT: 61.5 IN

## 2020-12-11 DIAGNOSIS — N84.0 POLYP OF CORPUS UTERI: ICD-10-CM

## 2020-12-11 PROCEDURE — 99072 ADDL SUPL MATRL&STAF TM PHE: CPT

## 2020-12-11 PROCEDURE — 36415 COLL VENOUS BLD VENIPUNCTURE: CPT

## 2020-12-11 PROCEDURE — 93000 ELECTROCARDIOGRAM COMPLETE: CPT

## 2020-12-11 PROCEDURE — 99214 OFFICE O/P EST MOD 30 MIN: CPT | Mod: 25

## 2020-12-14 LAB
ALBUMIN SERPL ELPH-MCNC: 4.6 G/DL
ALP BLD-CCNC: 57 U/L
ALT SERPL-CCNC: 10 U/L
ANION GAP SERPL CALC-SCNC: 12 MMOL/L
APTT BLD: 31.8 SEC
AST SERPL-CCNC: 16 U/L
BASOPHILS # BLD AUTO: 0.06 K/UL
BASOPHILS NFR BLD AUTO: 1 %
BILIRUB SERPL-MCNC: 0.5 MG/DL
BUN SERPL-MCNC: 13 MG/DL
CALCIUM SERPL-MCNC: 9.6 MG/DL
CHLORIDE SERPL-SCNC: 102 MMOL/L
CHOLEST SERPL-MCNC: 225 MG/DL
CO2 SERPL-SCNC: 24 MMOL/L
CREAT SERPL-MCNC: 0.79 MG/DL
EOSINOPHIL # BLD AUTO: 0.23 K/UL
EOSINOPHIL NFR BLD AUTO: 4 %
GLUCOSE SERPL-MCNC: 94 MG/DL
HCT VFR BLD CALC: 40.8 %
HDLC SERPL-MCNC: 80 MG/DL
HGB BLD-MCNC: 13.1 G/DL
IMM GRANULOCYTES NFR BLD AUTO: 0.2 %
INR PPP: 0.91 RATIO
LDLC SERPL CALC-MCNC: 118 MG/DL
LYMPHOCYTES # BLD AUTO: 1.6 K/UL
LYMPHOCYTES NFR BLD AUTO: 27.5 %
MAN DIFF?: NORMAL
MCHC RBC-ENTMCNC: 30.2 PG
MCHC RBC-ENTMCNC: 32.1 GM/DL
MCV RBC AUTO: 94 FL
MONOCYTES # BLD AUTO: 0.62 K/UL
MONOCYTES NFR BLD AUTO: 10.7 %
NEUTROPHILS # BLD AUTO: 3.29 K/UL
NEUTROPHILS NFR BLD AUTO: 56.6 %
NONHDLC SERPL-MCNC: 145 MG/DL
PLATELET # BLD AUTO: 278 K/UL
POTASSIUM SERPL-SCNC: 4.3 MMOL/L
PROT SERPL-MCNC: 6.8 G/DL
PT BLD: 10.8 SEC
RBC # BLD: 4.34 M/UL
RBC # FLD: 13.3 %
SARS-COV-2 IGG SERPL IA-ACNC: 0.09 INDEX
SARS-COV-2 IGG SERPL QL IA: NEGATIVE
SODIUM SERPL-SCNC: 139 MMOL/L
T4 FREE SERPL-MCNC: 1.3 NG/DL
TRIGL SERPL-MCNC: 134 MG/DL
TSH SERPL-ACNC: 2.98 UIU/ML
WBC # FLD AUTO: 5.81 K/UL

## 2020-12-14 NOTE — HISTORY OF PRESENT ILLNESS
[No Pertinent Cardiac History] : no history of aortic stenosis, atrial fibrillation, coronary artery disease, recent myocardial infarction, or implantable device/pacemaker [No Pertinent Pulmonary History] : no history of asthma, COPD, sleep apnea, or smoking [No Adverse Anesthesia Reaction] : no adverse anesthesia reaction in self or family member [(Patient denies any chest pain, claudication, dyspnea on exertion, orthopnea, palpitations or syncope)] : Patient denies any chest pain, claudication, dyspnea on exertion, orthopnea, palpitations or syncope [Chronic Anticoagulation] : no chronic anticoagulation [Chronic Kidney Disease] : no chronic kidney disease [Diabetes] : no diabetes [FreeTextEntry1] : D+C for uterine polyps [FreeTextEntry2] : 12/18/20 [FreeTextEntry4] : Presents for presurgical clearance.  Denies recent illness, cough, temp elevation, urinary symptoms.

## 2020-12-14 NOTE — RESULTS/DATA
[] : results reviewed [de-identified] : acceptable [de-identified] : acceptable [de-identified] : acceptable [de-identified] : RSR without changes; stable tracing for this patient

## 2020-12-23 PROBLEM — Z87.09 HISTORY OF ACUTE BRONCHITIS: Status: RESOLVED | Noted: 2020-02-03 | Resolved: 2020-12-23

## 2021-01-12 ENCOUNTER — APPOINTMENT (OUTPATIENT)
Dept: FAMILY MEDICINE | Facility: CLINIC | Age: 71
End: 2021-01-12
Payer: MEDICARE

## 2021-01-12 VITALS
HEART RATE: 68 BPM | DIASTOLIC BLOOD PRESSURE: 70 MMHG | RESPIRATION RATE: 20 BRPM | WEIGHT: 157 LBS | SYSTOLIC BLOOD PRESSURE: 124 MMHG | HEIGHT: 61.5 IN | BODY MASS INDEX: 29.26 KG/M2

## 2021-01-12 DIAGNOSIS — R10.9 UNSPECIFIED ABDOMINAL PAIN: ICD-10-CM

## 2021-01-12 PROCEDURE — 99072 ADDL SUPL MATRL&STAF TM PHE: CPT

## 2021-01-12 PROCEDURE — 99213 OFFICE O/P EST LOW 20 MIN: CPT | Mod: 25

## 2021-01-12 PROCEDURE — 36415 COLL VENOUS BLD VENIPUNCTURE: CPT

## 2021-01-12 NOTE — PHYSICAL EXAM
[No Acute Distress] : no acute distress [Supple] : supple [No Edema] : there was no peripheral edema [Normal] : soft, non-tender, non-distended, no masses palpated, no HSM and normal bowel sounds [No Focal Deficits] : no focal deficits [Alert and Oriented x3] : oriented to person, place, and time

## 2021-01-12 NOTE — ASSESSMENT
[FreeTextEntry1] : Abdominal exam benign at this encounter - feel prudent to order abdominal CT\par Lab profiles drawn in office and sent

## 2021-01-12 NOTE — HISTORY OF PRESENT ILLNESS
[de-identified] : Presents for F/U with labs with attention to lipid.  Tolerating statin.  Also C/O ongoing issues with mid abdominal pain - states intermittent but concerned as mother had "liver cancer" (? primary).

## 2021-01-13 LAB
ALBUMIN SERPL ELPH-MCNC: 4.2 G/DL
ALP BLD-CCNC: 60 U/L
ALT SERPL-CCNC: 13 U/L
AMYLASE/CREAT SERPL: 56 U/L
ANION GAP SERPL CALC-SCNC: 11 MMOL/L
AST SERPL-CCNC: 17 U/L
BILIRUB SERPL-MCNC: 0.3 MG/DL
BUN SERPL-MCNC: 10 MG/DL
CALCIUM SERPL-MCNC: 9.8 MG/DL
CHLORIDE SERPL-SCNC: 105 MMOL/L
CHOLEST SERPL-MCNC: 185 MG/DL
CO2 SERPL-SCNC: 24 MMOL/L
CREAT SERPL-MCNC: 0.83 MG/DL
GLUCOSE SERPL-MCNC: 95 MG/DL
HDLC SERPL-MCNC: 82 MG/DL
LDLC SERPL CALC-MCNC: 86 MG/DL
LPL SERPL-CCNC: 24 U/L
NONHDLC SERPL-MCNC: 102 MG/DL
POTASSIUM SERPL-SCNC: 5.1 MMOL/L
PROT SERPL-MCNC: 6.8 G/DL
SODIUM SERPL-SCNC: 140 MMOL/L
TRIGL SERPL-MCNC: 81 MG/DL

## 2021-01-19 ENCOUNTER — OUTPATIENT (OUTPATIENT)
Dept: OUTPATIENT SERVICES | Facility: HOSPITAL | Age: 71
LOS: 1 days | End: 2021-01-19
Payer: MEDICARE

## 2021-01-19 ENCOUNTER — APPOINTMENT (OUTPATIENT)
Dept: CT IMAGING | Facility: HOSPITAL | Age: 71
End: 2021-01-19
Payer: MEDICARE

## 2021-01-19 DIAGNOSIS — R10.9 UNSPECIFIED ABDOMINAL PAIN: ICD-10-CM

## 2021-01-19 PROCEDURE — 74176 CT ABD & PELVIS W/O CONTRAST: CPT | Mod: 26

## 2021-01-19 PROCEDURE — 74176 CT ABD & PELVIS W/O CONTRAST: CPT

## 2021-04-29 DIAGNOSIS — L23.7 ALLERGIC CONTACT DERMATITIS DUE TO PLANTS, EXCEPT FOOD: ICD-10-CM

## 2021-05-11 ENCOUNTER — APPOINTMENT (OUTPATIENT)
Dept: FAMILY MEDICINE | Facility: CLINIC | Age: 71
End: 2021-05-11
Payer: MEDICARE

## 2021-05-11 VITALS
SYSTOLIC BLOOD PRESSURE: 122 MMHG | HEIGHT: 61.5 IN | DIASTOLIC BLOOD PRESSURE: 70 MMHG | BODY MASS INDEX: 30.75 KG/M2 | HEART RATE: 68 BPM | WEIGHT: 165 LBS | RESPIRATION RATE: 20 BRPM

## 2021-05-11 PROCEDURE — 36415 COLL VENOUS BLD VENIPUNCTURE: CPT

## 2021-05-11 PROCEDURE — G0439: CPT

## 2021-05-11 PROCEDURE — 93000 ELECTROCARDIOGRAM COMPLETE: CPT

## 2021-05-11 PROCEDURE — 99072 ADDL SUPL MATRL&STAF TM PHE: CPT

## 2021-05-11 NOTE — HEALTH RISK ASSESSMENT
[Yes] : Yes [2 - 4 times a month (2 pts)] : 2-4 times a month (2 points) [1 or 2 (0 pts)] : 1 or 2 (0 points) [Never (0 pts)] : Never (0 points) [No] : In the past 12 months have you used drugs other than those required for medical reasons? No [No falls in past year] : Patient reported no falls in the past year [0] : 2) Feeling down, depressed, or hopeless: Not at all (0) [Fully functional (bathing, dressing, toileting, transferring, walking, feeding)] : Fully functional (bathing, dressing, toileting, transferring, walking, feeding) [Fully functional (using the telephone, shopping, preparing meals, housekeeping, doing laundry, using] : Fully functional and needs no help or supervision to perform IADLs (using the telephone, shopping, preparing meals, housekeeping, doing laundry, using transportation, managing medications and managing finances) [Reviewed no changes] : Reviewed no changes [] : No [Audit-CScore] : 2 [AdvancecareDate] : 05/21

## 2021-05-11 NOTE — COUNSELING
[de-identified] : Healthy eating and activities [None] : None [Good understanding] : Patient has a good understanding of lifestyle changes and steps needed to achieve self management goal

## 2021-05-11 NOTE — HISTORY OF PRESENT ILLNESS
[FreeTextEntry1] : Requests comprehensive exam [de-identified] : Presents for comprehensive exam.  States feeling generally well; trying to watch diet but does acknowledge a wt gain - plans on becoming more active.  Reviewed screening and immunizations - note pt consistently declines all vaccines.

## 2021-05-12 LAB
ALBUMIN SERPL ELPH-MCNC: 4.1 G/DL
ALP BLD-CCNC: 58 U/L
ALT SERPL-CCNC: 9 U/L
ANION GAP SERPL CALC-SCNC: 10 MMOL/L
APPEARANCE: CLEAR
AST SERPL-CCNC: 13 U/L
BASOPHILS # BLD AUTO: 0.05 K/UL
BASOPHILS NFR BLD AUTO: 0.8 %
BILIRUB SERPL-MCNC: 0.4 MG/DL
BILIRUBIN URINE: NEGATIVE
BLOOD URINE: NEGATIVE
BUN SERPL-MCNC: 13 MG/DL
CALCIUM SERPL-MCNC: 9.5 MG/DL
CHLORIDE SERPL-SCNC: 107 MMOL/L
CHOLEST SERPL-MCNC: 155 MG/DL
CO2 SERPL-SCNC: 25 MMOL/L
COLOR: NORMAL
CREAT SERPL-MCNC: 0.65 MG/DL
EOSINOPHIL # BLD AUTO: 0.35 K/UL
EOSINOPHIL NFR BLD AUTO: 5.3 %
FOLATE SERPL-MCNC: >20 NG/ML
GLUCOSE QUALITATIVE U: NEGATIVE
GLUCOSE SERPL-MCNC: 97 MG/DL
HCT VFR BLD CALC: 39.7 %
HDLC SERPL-MCNC: 67 MG/DL
HGB BLD-MCNC: 12.4 G/DL
IMM GRANULOCYTES NFR BLD AUTO: 0.8 %
KETONES URINE: NEGATIVE
LDLC SERPL CALC-MCNC: 74 MG/DL
LEUKOCYTE ESTERASE URINE: NEGATIVE
LYMPHOCYTES # BLD AUTO: 1.39 K/UL
LYMPHOCYTES NFR BLD AUTO: 21 %
MAN DIFF?: NORMAL
MCHC RBC-ENTMCNC: 29.8 PG
MCHC RBC-ENTMCNC: 31.2 GM/DL
MCV RBC AUTO: 95.4 FL
MONOCYTES # BLD AUTO: 0.62 K/UL
MONOCYTES NFR BLD AUTO: 9.4 %
NEUTROPHILS # BLD AUTO: 4.15 K/UL
NEUTROPHILS NFR BLD AUTO: 62.7 %
NITRITE URINE: NEGATIVE
NONHDLC SERPL-MCNC: 88 MG/DL
PH URINE: 5.5
PLATELET # BLD AUTO: 253 K/UL
POTASSIUM SERPL-SCNC: 4.4 MMOL/L
PROT SERPL-MCNC: 6.5 G/DL
PROTEIN URINE: NEGATIVE
RBC # BLD: 4.16 M/UL
RBC # FLD: 14.3 %
SODIUM SERPL-SCNC: 142 MMOL/L
SPECIFIC GRAVITY URINE: 1.01
T4 FREE SERPL-MCNC: 1.1 NG/DL
TRIGL SERPL-MCNC: 72 MG/DL
TSH SERPL-ACNC: 2.47 UIU/ML
UROBILINOGEN URINE: NORMAL
VIT B12 SERPL-MCNC: 450 PG/ML
WBC # FLD AUTO: 6.61 K/UL

## 2021-06-21 ENCOUNTER — APPOINTMENT (OUTPATIENT)
Dept: ORTHOPEDIC SURGERY | Facility: CLINIC | Age: 71
End: 2021-06-21
Payer: MEDICARE

## 2021-06-21 VITALS
DIASTOLIC BLOOD PRESSURE: 78 MMHG | SYSTOLIC BLOOD PRESSURE: 152 MMHG | HEIGHT: 61.5 IN | WEIGHT: 159 LBS | TEMPERATURE: 97.6 F | HEART RATE: 85 BPM | BODY MASS INDEX: 29.64 KG/M2

## 2021-06-21 PROCEDURE — 99072 ADDL SUPL MATRL&STAF TM PHE: CPT

## 2021-06-21 PROCEDURE — 73564 X-RAY EXAM KNEE 4 OR MORE: CPT | Mod: RT

## 2021-06-21 PROCEDURE — 99203 OFFICE O/P NEW LOW 30 MIN: CPT

## 2021-06-21 RX ORDER — PSYLLIUM HUSK 0.4 G
CAPSULE ORAL
Refills: 0 | Status: ACTIVE | COMMUNITY

## 2021-06-21 RX ORDER — CHROMIUM 200 MCG
TABLET ORAL
Refills: 0 | Status: ACTIVE | COMMUNITY

## 2021-06-28 ENCOUNTER — OUTPATIENT (OUTPATIENT)
Dept: OUTPATIENT SERVICES | Facility: HOSPITAL | Age: 71
LOS: 1 days | End: 2021-06-28
Payer: MEDICARE

## 2021-06-28 ENCOUNTER — APPOINTMENT (OUTPATIENT)
Dept: MRI IMAGING | Facility: HOSPITAL | Age: 71
End: 2021-06-28
Payer: MEDICARE

## 2021-06-28 ENCOUNTER — RESULT REVIEW (OUTPATIENT)
Age: 71
End: 2021-06-28

## 2021-06-28 DIAGNOSIS — Z00.8 ENCOUNTER FOR OTHER GENERAL EXAMINATION: ICD-10-CM

## 2021-06-28 PROCEDURE — 73721 MRI JNT OF LWR EXTRE W/O DYE: CPT

## 2021-06-28 PROCEDURE — 73721 MRI JNT OF LWR EXTRE W/O DYE: CPT | Mod: 26,RT

## 2021-06-30 ENCOUNTER — TRANSCRIPTION ENCOUNTER (OUTPATIENT)
Age: 71
End: 2021-06-30

## 2021-07-01 DIAGNOSIS — M23.91 UNSPECIFIED INTERNAL DERANGEMENT OF RIGHT KNEE: ICD-10-CM

## 2021-07-07 ENCOUNTER — APPOINTMENT (OUTPATIENT)
Dept: ORTHOPEDIC SURGERY | Facility: CLINIC | Age: 71
End: 2021-07-07
Payer: MEDICARE

## 2021-07-07 VITALS — WEIGHT: 159 LBS | BODY MASS INDEX: 29.64 KG/M2 | HEIGHT: 61.5 IN

## 2021-07-07 DIAGNOSIS — M54.12 RADICULOPATHY, CERVICAL REGION: ICD-10-CM

## 2021-07-07 PROCEDURE — 99214 OFFICE O/P EST MOD 30 MIN: CPT

## 2021-07-07 PROCEDURE — 99072 ADDL SUPL MATRL&STAF TM PHE: CPT

## 2021-07-13 ENCOUNTER — NON-APPOINTMENT (OUTPATIENT)
Age: 71
End: 2021-07-13

## 2021-07-23 ENCOUNTER — OUTPATIENT (OUTPATIENT)
Dept: OUTPATIENT SERVICES | Facility: HOSPITAL | Age: 71
LOS: 1 days | End: 2021-07-23
Payer: MEDICARE

## 2021-07-23 VITALS
HEIGHT: 62 IN | TEMPERATURE: 98 F | RESPIRATION RATE: 16 BRPM | SYSTOLIC BLOOD PRESSURE: 143 MMHG | DIASTOLIC BLOOD PRESSURE: 81 MMHG | HEART RATE: 77 BPM | OXYGEN SATURATION: 98 % | WEIGHT: 164.46 LBS

## 2021-07-23 DIAGNOSIS — Z01.818 ENCOUNTER FOR OTHER PREPROCEDURAL EXAMINATION: ICD-10-CM

## 2021-07-23 DIAGNOSIS — M23.91 UNSPECIFIED INTERNAL DERANGEMENT OF RIGHT KNEE: ICD-10-CM

## 2021-07-23 DIAGNOSIS — Z98.890 OTHER SPECIFIED POSTPROCEDURAL STATES: Chronic | ICD-10-CM

## 2021-07-23 DIAGNOSIS — S83.281A OTHER TEAR OF LATERAL MENISCUS, CURRENT INJURY, RIGHT KNEE, INITIAL ENCOUNTER: ICD-10-CM

## 2021-07-23 DIAGNOSIS — S83.289A OTHER TEAR OF LATERAL MENISCUS, CURRENT INJURY, UNSPECIFIED KNEE, INITIAL ENCOUNTER: ICD-10-CM

## 2021-07-23 DIAGNOSIS — Z98.82 BREAST IMPLANT STATUS: Chronic | ICD-10-CM

## 2021-07-23 LAB
ANION GAP SERPL CALC-SCNC: 6 MMOL/L — SIGNIFICANT CHANGE UP (ref 5–17)
BUN SERPL-MCNC: 23 MG/DL — SIGNIFICANT CHANGE UP (ref 7–23)
CALCIUM SERPL-MCNC: 9.3 MG/DL — SIGNIFICANT CHANGE UP (ref 8.4–10.5)
CHLORIDE SERPL-SCNC: 107 MMOL/L — SIGNIFICANT CHANGE UP (ref 96–108)
CO2 SERPL-SCNC: 29 MMOL/L — SIGNIFICANT CHANGE UP (ref 22–31)
CREAT SERPL-MCNC: 0.86 MG/DL — SIGNIFICANT CHANGE UP (ref 0.5–1.3)
GLUCOSE SERPL-MCNC: 102 MG/DL — HIGH (ref 70–99)
HCT VFR BLD CALC: 40.7 % — SIGNIFICANT CHANGE UP (ref 34.5–45)
HGB BLD-MCNC: 13 G/DL — SIGNIFICANT CHANGE UP (ref 11.5–15.5)
MCHC RBC-ENTMCNC: 29.5 PG — SIGNIFICANT CHANGE UP (ref 27–34)
MCHC RBC-ENTMCNC: 31.9 GM/DL — LOW (ref 32–36)
MCV RBC AUTO: 92.5 FL — SIGNIFICANT CHANGE UP (ref 80–100)
NRBC # BLD: 0 /100 WBCS — SIGNIFICANT CHANGE UP (ref 0–0)
PLATELET # BLD AUTO: 264 K/UL — SIGNIFICANT CHANGE UP (ref 150–400)
POTASSIUM SERPL-MCNC: 4.7 MMOL/L — SIGNIFICANT CHANGE UP (ref 3.5–5.3)
POTASSIUM SERPL-SCNC: 4.7 MMOL/L — SIGNIFICANT CHANGE UP (ref 3.5–5.3)
RBC # BLD: 4.4 M/UL — SIGNIFICANT CHANGE UP (ref 3.8–5.2)
RBC # FLD: 13.5 % — SIGNIFICANT CHANGE UP (ref 10.3–14.5)
SODIUM SERPL-SCNC: 142 MMOL/L — SIGNIFICANT CHANGE UP (ref 135–145)
WBC # BLD: 7.71 K/UL — SIGNIFICANT CHANGE UP (ref 3.8–10.5)
WBC # FLD AUTO: 7.71 K/UL — SIGNIFICANT CHANGE UP (ref 3.8–10.5)

## 2021-07-23 PROCEDURE — 36415 COLL VENOUS BLD VENIPUNCTURE: CPT

## 2021-07-23 PROCEDURE — 93005 ELECTROCARDIOGRAM TRACING: CPT

## 2021-07-23 PROCEDURE — G0463: CPT

## 2021-07-23 PROCEDURE — 85027 COMPLETE CBC AUTOMATED: CPT

## 2021-07-23 PROCEDURE — 93010 ELECTROCARDIOGRAM REPORT: CPT

## 2021-07-23 PROCEDURE — 80048 BASIC METABOLIC PNL TOTAL CA: CPT

## 2021-07-23 NOTE — H&P PST ADULT - NSICDXPASTMEDICALHX_GEN_ALL_CORE_FT
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 elevated ferritin treated with phlebotomy- now resolved- approx 5yrs ago.    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 elevated ferritin treated with phlebotomy- now resolved- approx 5yrs ago.    Hyperlipidemia

## 2021-07-23 NOTE — H&P PST ADULT - NSICDXPASTSURGICALHX_GEN_ALL_CORE_FT
PAST SURGICAL HISTORY:  H/O removal of cyst left knee    History of removal of cyst x2- approx 1955 & 1962- left neck    S/P arthroscopy of left knee approx 2007    S/P bilateral breast implants x2- approx 1983, replaced approx 2017    S/P bunionectomy 1980s- bilateral    S/P ORIF (open reduction internal fixation) fracture 1997- right ankle

## 2021-07-23 NOTE — H&P PST ADULT - MUSCULOSKELETAL
details… right knee/no joint swelling/no joint erythema/no joint warmth/no calf tenderness/decreased ROM/decreased ROM due to pain/diminished strength detailed exam

## 2021-07-23 NOTE — H&P PST ADULT - NSANTHOSAYNRD_GEN_A_CORE
No. GARY screening performed.  STOP BANG Legend: 0-2 = LOW Risk; 3-4 = INTERMEDIATE Risk; 5-8 = HIGH Risk

## 2021-07-23 NOTE — H&P PST ADULT - NS SC CAGE ALCOHOL ANNOYED YOU
symptomatically minimal tenderness right temple area palpable pulses both temporal region  Last Recorded Vitals  Blood pressure (!) 157/91, pulse (!) 108, temperature 98.8 °F (37.1 °C), temperature source Oral, resp. rate 18, height 5' 11\" (1.803 m), weight (!) 143 kg (315 lb 4.1 oz), SpO2 94 %.    Labs  Results for orders placed or performed during the hospital encounter of 10/20/20   CBC with Automated Differential   Result Value    WBC 12.5 (H)    RBC 4.94    HGB 14.7    HCT 43.3    MCV 87.7    MCH 29.8    MCHC 33.9    RDW-CV 12.7        NRBC 0    DIFF TYPE AUTOMATED DIFFERENTIAL    Neutrophil 59    LYMPH 29    MONO 8    EOSIN 2    BASO 1    Percent Immature Granuloctyes 1    Absolute Neutrophil 7.5    Absolute Lymph 3.6    Absolute Mono 1.1 (H)    Absolute Eos 0.3    Absolute Baso 0.1    Absolute Immature Granulocytes 0.1   Comprehensive Metabolic Panel   Result Value    Sodium 139    Potassium 3.8    Chloride 107    Carbon Dioxide 27    Anion Gap 9 (L)    Glucose 94    BUN 9    Creatinine 0.90    GFR Estimate,  >90     Comment: eGFR results = or >90 mL/min/1.73m2 = Normal kidney function.    GFR Estimate, Non  >90     Comment: eGFR results = or >90 mL/min/1.73m2 = Normal kidney function.    BUN/Creatinine Ratio 10    CALCIUM 8.8    TOTAL BILIRUBIN 0.5    AST/SGOT 20    ALT/SGPT 44    ALK PHOSPHATASE 90    TOTAL PROTEIN 8.2    Albumin 3.7    GLOBULIN 4.5 (H)    A/G Ratio, Serum 0.8 (L)   Partial Thromboplastin Time   Result Value    PTT 32     Comment: PTT  Therapeutic Range:  45-70 seconds.   Prothrombin Time   Result Value    PROTIME 10.4    INR 1.0     Comment: INR Therapeutic Range: 2.0 to 3.0 (2.5 to 3.5 recommended for recurrent thrombotic episodes and mechanical prosthetic heart valves.)    Troponin I Ultra Sensitive   Result Value    TROPONIN I <0.02   Sedimentation Rate Westergren   Result Value    ESR 43 (H)   C Reactive Protein   Result Value    C-REACTIVE  Axis (Degrees) 17    R Axis (Degrees) 19    T Axis (no

## 2021-07-23 NOTE — H&P PST ADULT - ASSESSMENT
71yo female patient scheduled for surgery on 08/06/2021. She will obtain Medical Clearance by Dr. Bautista. She is asked to have him comment on her RBBB on EKG. She will be NPO as per Anesthesia and will take no meds on AM of surgery. She will hold Fish Oil, Evening Primrose Oil and Advil for 7 days pre-op. As per protocol, she is unvaccinated and will be screened for Covid19 on 08.04/2021.

## 2021-07-23 NOTE — H&P PST ADULT - NSICDXPROBLEM_GEN_ALL_CORE_FT
PROBLEM DIAGNOSES  Problem: Internal derangement of right knee  Assessment and Plan: RIGHT Knee Arthroscopy and any other indicated procedures on 08/06/2021    Problem: Other tear of lateral meniscus of right knee  Assessment and Plan: Partial Lateral Meniscectomy on 08/06/2021

## 2021-07-23 NOTE — H&P PST ADULT - HISTORY OF PRESENT ILLNESS
69yo female patient who states that she thinks that her right knee pain originated in 2019 when stepping on a ladder. She twisted her knee and she states that she heard a "rubber band pop." Since that time she has had progressively worsening right knee pain and eventually she had difficulty weight bearing on her right leg and she has been having shooting pain from knee down to the ankle/foot area. She rates the pain at 2/10 to 10/10 with prolonged weight bearing or after sitting for a long time. She is taking Advil 400mg prn with some relief. She was told that arthroscopy is recommended and presents today for PSTs.

## 2021-07-23 NOTE — H&P PST ADULT - NSICDXFAMILYHX_GEN_ALL_CORE_FT
FAMILY HISTORY:  Father  Still living? No  FHx: type 2 diabetes mellitus, Age at diagnosis: Age Unknown  Paternal family history of dementia, Age at diagnosis: Age Unknown    Mother  Still living? No  Family history of secondary liver cancer, Age at diagnosis: Age Unknown  FH: manic depression, Age at diagnosis: Age Unknown    Sibling  Still living? No  Family history of bipolar disorder, Age at diagnosis: Age Unknown  Family history of bipolar disorder, Age at diagnosis: Age Unknown    Child  Still living? Yes, Estimated age: 41-50  Family history of renal cell carcinoma, Age at diagnosis: Age Unknown

## 2021-07-27 ENCOUNTER — APPOINTMENT (OUTPATIENT)
Dept: FAMILY MEDICINE | Facility: CLINIC | Age: 71
End: 2021-07-27
Payer: MEDICARE

## 2021-07-27 VITALS
BODY MASS INDEX: 30.57 KG/M2 | HEART RATE: 84 BPM | RESPIRATION RATE: 15 BRPM | TEMPERATURE: 96.7 F | HEIGHT: 61.5 IN | SYSTOLIC BLOOD PRESSURE: 124 MMHG | OXYGEN SATURATION: 96 % | WEIGHT: 164 LBS | DIASTOLIC BLOOD PRESSURE: 70 MMHG

## 2021-07-27 DIAGNOSIS — M85.80 OTHER SPECIFIED DISORDERS OF BONE DENSITY AND STRUCTURE, UNSPECIFIED SITE: ICD-10-CM

## 2021-07-27 DIAGNOSIS — M23.91 UNSPECIFIED INTERNAL DERANGEMENT OF RIGHT KNEE: ICD-10-CM

## 2021-07-27 DIAGNOSIS — S83.289A OTHER TEAR OF LATERAL MENISCUS, CURRENT INJURY, UNSPECIFIED KNEE, INITIAL ENCOUNTER: ICD-10-CM

## 2021-07-27 DIAGNOSIS — M19.90 UNSPECIFIED OSTEOARTHRITIS, UNSPECIFIED SITE: ICD-10-CM

## 2021-07-27 PROCEDURE — 99214 OFFICE O/P EST MOD 30 MIN: CPT

## 2021-07-27 PROCEDURE — 99072 ADDL SUPL MATRL&STAF TM PHE: CPT

## 2021-07-27 RX ORDER — METHYLPREDNISOLONE 4 MG/1
4 TABLET ORAL
Qty: 1 | Refills: 0 | Status: DISCONTINUED | COMMUNITY
Start: 2021-04-29 | End: 2021-07-27

## 2021-07-27 RX ORDER — BENZONATATE 100 MG/1
100 CAPSULE ORAL 3 TIMES DAILY
Qty: 21 | Refills: 0 | Status: DISCONTINUED | COMMUNITY
Start: 2020-02-07 | End: 2021-07-27

## 2021-07-27 NOTE — ASSESSMENT
[High Risk Surgery - Intraperitoneal, Intrathoracic or Supringuinal Vascular Procedures] : High Risk Surgery - Intraperitoneal, Intrathoracic or Supringuinal Vascular Procedures - No (0) [Ischemic Heart Disease] : Ischemic Heart Disease - No (0) [Congestive Heart Failure] : Congestive Heart Failure - No (0) [Prior Cerebrovascular Accident or TIA] : Prior Cerebrovascular Accident or TIA - No (0) [Creatinine >= 2mg/dL (1 Point)] : Creatinine >= 2mg/dL - No (0) [Insulin-dependent Diabetic (1 Point)] : Insulin-dependent Diabetic - No (0) [0] : 0 , RCRI Class: I, Risk of Post-Op Cardiac Complications: 3.9%, 95% CI for Risk Estimate: 2.8% - 5.4% [Patient Optimized for Surgery] : Patient optimized for surgery [As per surgery] : as per surgery [FreeTextEntry4] : Completed physical Exam, reviewed pre op labs,\par EK2021 72 bpm NSR with RBBB, results reviewed compared with EKG, Oct 2015, RSR' without changes; stable tracing for this patient \par Advised pt to avoid any NSAIDs, vitamins, aspirin for 7 days till post surgery.\par  Avoid any food or drinks past midnight, the day prior to surgery. \par  RTO for a follow up appointment post surgery.\par \par RCRI Class I: low risk for procedure\par Pt at current time has been optimized for surgery

## 2021-07-27 NOTE — PHYSICAL EXAM
[No Acute Distress] : no acute distress [Well Nourished] : well nourished [PERRL] : pupils equal round and reactive to light [EOMI] : extraocular movements intact [Supple] : supple [Clear to Auscultation] : lungs were clear to auscultation bilaterally [Normal S1, S2] : normal S1 and S2 [No Edema] : there was no peripheral edema [Non Tender] : non-tender [No Rash] : no rash [Normal Gait] : normal gait [Normal Affect] : the affect was normal [de-identified] : right knee lateral aspect pain on ambulation, ROM

## 2021-07-27 NOTE — RESULTS/DATA
[] : results reviewed [de-identified] : 07/23/2021: Results Within acceptable limits [de-identified] : 07/23/2021: Results Within acceptable limits [de-identified] : EK2021 72 bpm NSR with RBBB, results reviewed compared with EKG, Oct 2015, RSR' without changes; stable tracing for this patient

## 2021-07-27 NOTE — HISTORY OF PRESENT ILLNESS
[No Pertinent Cardiac History] : no history of aortic stenosis, atrial fibrillation, coronary artery disease, recent myocardial infarction, or implantable device/pacemaker [No Pertinent Pulmonary History] : no history of asthma, COPD, sleep apnea, or smoking [No Adverse Anesthesia Reaction] : no adverse anesthesia reaction in self or family member [(Patient denies any chest pain, claudication, dyspnea on exertion, orthopnea, palpitations or syncope)] : Patient denies any chest pain, claudication, dyspnea on exertion, orthopnea, palpitations or syncope [Good (7-10 METs)] : Good (7-10 METs) [Aortic Stenosis] : no aortic stenosis [Atrial Fibrillation] : no atrial fibrillation [Coronary Artery Disease] : no coronary artery disease [Recent Myocardial Infarction] : no recent myocardial infarction [Implantable Device/Pacemaker] : no implantable device/pacemaker [Asthma] : no asthma [COPD] : no COPD [Sleep Apnea] : no sleep apnea [Smoker] : not a smoker [Family Member] : no family member with adverse anesthesia reaction/sudden death [Self] : no previous adverse anesthesia reaction [Chronic Anticoagulation] : no chronic anticoagulation [Chronic Kidney Disease] : no chronic kidney disease [Diabetes] : no diabetes [FreeTextEntry1] : Arthroscopy of right knee w/ partial lateral meniscectomy  [FreeTextEntry2] : 08/06/2021 [FreeTextEntry3] : Dr. Barroso  [FreeTextEntry4] : Ms Trice Batista is a 71 yo female presents today for preop evaluation for Arthroscopy of right knee w/ partial lateral meniscectomy to be done by Dr. Barroso on 08/06/2021 at [facility]. Pt has long standing history of right knee discomfort now opting for his procedure.  [FreeTextEntry7] : EK2021 72 bpm NSR with RBBB, results reviewed compared with EKG, Oct 2015, RSR' without changes; stable tracing for this patient

## 2021-07-27 NOTE — REVIEW OF SYSTEMS
[Joint Pain] : joint pain [Muscle Pain] : muscle pain [Negative] : Heme/Lymph [FreeTextEntry9] : right knee pain

## 2021-08-05 ENCOUNTER — OUTPATIENT (OUTPATIENT)
Dept: OUTPATIENT SERVICES | Facility: HOSPITAL | Age: 71
LOS: 1 days | End: 2021-08-05
Payer: MEDICARE

## 2021-08-05 ENCOUNTER — TRANSCRIPTION ENCOUNTER (OUTPATIENT)
Age: 71
End: 2021-08-05

## 2021-08-05 DIAGNOSIS — Z98.890 OTHER SPECIFIED POSTPROCEDURAL STATES: Chronic | ICD-10-CM

## 2021-08-05 DIAGNOSIS — Z98.82 BREAST IMPLANT STATUS: Chronic | ICD-10-CM

## 2021-08-05 DIAGNOSIS — Z20.828 CONTACT WITH AND (SUSPECTED) EXPOSURE TO OTHER VIRAL COMMUNICABLE DISEASES: ICD-10-CM

## 2021-08-05 PROBLEM — Z86.39 PERSONAL HISTORY OF OTHER ENDOCRINE, NUTRITIONAL AND METABOLIC DISEASE: Chronic | Status: ACTIVE | Noted: 2021-07-23

## 2021-08-05 PROBLEM — Z28.9 IMMUNIZATION NOT CARRIED OUT FOR UNSPECIFIED REASON: Chronic | Status: ACTIVE | Noted: 2021-07-23

## 2021-08-05 PROBLEM — K21.9 GASTRO-ESOPHAGEAL REFLUX DISEASE WITHOUT ESOPHAGITIS: Chronic | Status: ACTIVE | Noted: 2021-07-23

## 2021-08-05 PROBLEM — E66.9 OBESITY, UNSPECIFIED: Chronic | Status: ACTIVE | Noted: 2021-07-23

## 2021-08-05 PROBLEM — E78.5 HYPERLIPIDEMIA, UNSPECIFIED: Chronic | Status: ACTIVE | Noted: 2021-07-23

## 2021-08-05 LAB — SARS-COV-2 RNA SPEC QL NAA+PROBE: SIGNIFICANT CHANGE UP

## 2021-08-05 PROCEDURE — U0003: CPT

## 2021-08-05 PROCEDURE — U0005: CPT

## 2021-08-05 NOTE — ASU PATIENT PROFILE, ADULT - PSH
H/O removal of cyst  left knee  History of removal of cyst  x2- approx 1955 & 1962- left neck  S/P arthroscopy of left knee  approx 2007  S/P bilateral breast implants  x2- approx 1983, replaced approx 2017  S/P bunionectomy  1980s- bilateral  S/P ORIF (open reduction internal fixation) fracture  1997- right ankle

## 2021-08-05 NOTE — ASU PATIENT PROFILE, ADULT - PMH
Class 1 obesity with body mass index (BMI) of 30.0 to 30.9 in adult    COVID-19 vaccine series not administered    GERD (gastroesophageal reflux disease)  no meds  History of hemochromatosis  hemochromatosis carrier. elevated ferritin treated with phlebotomy- now resolved- approx 5yrs ago.  Hyperlipidemia

## 2021-08-06 ENCOUNTER — OUTPATIENT (OUTPATIENT)
Dept: OUTPATIENT SERVICES | Facility: HOSPITAL | Age: 71
LOS: 1 days | End: 2021-08-06
Payer: MEDICARE

## 2021-08-06 ENCOUNTER — APPOINTMENT (OUTPATIENT)
Dept: ORTHOPEDIC SURGERY | Facility: HOSPITAL | Age: 71
End: 2021-08-06

## 2021-08-06 ENCOUNTER — RESULT REVIEW (OUTPATIENT)
Age: 71
End: 2021-08-06

## 2021-08-06 VITALS
RESPIRATION RATE: 12 BRPM | SYSTOLIC BLOOD PRESSURE: 129 MMHG | OXYGEN SATURATION: 100 % | HEART RATE: 86 BPM | WEIGHT: 161.82 LBS | DIASTOLIC BLOOD PRESSURE: 56 MMHG | TEMPERATURE: 99 F | HEIGHT: 62 IN

## 2021-08-06 VITALS
DIASTOLIC BLOOD PRESSURE: 55 MMHG | RESPIRATION RATE: 15 BRPM | OXYGEN SATURATION: 98 % | SYSTOLIC BLOOD PRESSURE: 126 MMHG | HEART RATE: 80 BPM

## 2021-08-06 DIAGNOSIS — Z98.890 OTHER SPECIFIED POSTPROCEDURAL STATES: Chronic | ICD-10-CM

## 2021-08-06 DIAGNOSIS — Z98.82 BREAST IMPLANT STATUS: Chronic | ICD-10-CM

## 2021-08-06 DIAGNOSIS — M23.91 UNSPECIFIED INTERNAL DERANGEMENT OF RIGHT KNEE: ICD-10-CM

## 2021-08-06 DIAGNOSIS — S83.289A OTHER TEAR OF LATERAL MENISCUS, CURRENT INJURY, UNSPECIFIED KNEE, INITIAL ENCOUNTER: ICD-10-CM

## 2021-08-06 DIAGNOSIS — Z01.818 ENCOUNTER FOR OTHER PREPROCEDURAL EXAMINATION: ICD-10-CM

## 2021-08-06 PROCEDURE — 97161 PT EVAL LOW COMPLEX 20 MIN: CPT

## 2021-08-06 PROCEDURE — 88304 TISSUE EXAM BY PATHOLOGIST: CPT | Mod: 26

## 2021-08-06 PROCEDURE — 29880 ARTHRS KNE SRG MNISECTMY M&L: CPT | Mod: RT

## 2021-08-06 PROCEDURE — 88304 TISSUE EXAM BY PATHOLOGIST: CPT

## 2021-08-06 RX ORDER — SIMVASTATIN 20 MG/1
1 TABLET, FILM COATED ORAL
Qty: 0 | Refills: 0 | DISCHARGE

## 2021-08-06 RX ORDER — IBUPROFEN 200 MG
2 TABLET ORAL
Qty: 0 | Refills: 0 | DISCHARGE

## 2021-08-06 RX ORDER — APREPITANT 80 MG/1
40 CAPSULE ORAL ONCE
Refills: 0 | Status: COMPLETED | OUTPATIENT
Start: 2021-08-06 | End: 2021-08-06

## 2021-08-06 RX ORDER — CEFAZOLIN SODIUM 1 G
2000 VIAL (EA) INJECTION ONCE
Refills: 0 | Status: COMPLETED | OUTPATIENT
Start: 2021-08-06 | End: 2021-08-06

## 2021-08-06 RX ORDER — HYDROMORPHONE HYDROCHLORIDE 2 MG/ML
0.5 INJECTION INTRAMUSCULAR; INTRAVENOUS; SUBCUTANEOUS
Refills: 0 | Status: DISCONTINUED | OUTPATIENT
Start: 2021-08-06 | End: 2021-08-09

## 2021-08-06 RX ORDER — SODIUM CHLORIDE 9 MG/ML
1000 INJECTION, SOLUTION INTRAVENOUS
Refills: 0 | Status: DISCONTINUED | OUTPATIENT
Start: 2021-08-06 | End: 2021-08-09

## 2021-08-06 RX ORDER — CHLORHEXIDINE GLUCONATE 213 G/1000ML
1 SOLUTION TOPICAL ONCE
Refills: 0 | Status: COMPLETED | OUTPATIENT
Start: 2021-08-06 | End: 2021-08-06

## 2021-08-06 RX ORDER — OMEGA-3 ACID ETHYL ESTERS 1 G
1 CAPSULE ORAL
Qty: 0 | Refills: 0 | DISCHARGE

## 2021-08-06 RX ORDER — OXYCODONE HYDROCHLORIDE 5 MG/1
1 TABLET ORAL
Qty: 24 | Refills: 0
Start: 2021-08-06 | End: 2021-08-09

## 2021-08-06 RX ORDER — FOLIC ACID 0.8 MG
1 TABLET ORAL
Qty: 0 | Refills: 0 | DISCHARGE

## 2021-08-06 RX ORDER — ONDANSETRON 8 MG/1
4 TABLET, FILM COATED ORAL ONCE
Refills: 0 | Status: DISCONTINUED | OUTPATIENT
Start: 2021-08-06 | End: 2021-08-09

## 2021-08-06 RX ORDER — OXYCODONE HYDROCHLORIDE 5 MG/1
5 TABLET ORAL ONCE
Refills: 0 | Status: DISCONTINUED | OUTPATIENT
Start: 2021-08-06 | End: 2021-08-09

## 2021-08-06 RX ADMIN — APREPITANT 40 MILLIGRAM(S): 80 CAPSULE ORAL at 09:00

## 2021-08-06 RX ADMIN — CHLORHEXIDINE GLUCONATE 1 APPLICATION(S): 213 SOLUTION TOPICAL at 09:30

## 2021-08-06 RX ADMIN — SODIUM CHLORIDE 75 MILLILITER(S): 9 INJECTION, SOLUTION INTRAVENOUS at 12:43

## 2021-08-06 NOTE — BRIEF OPERATIVE NOTE - NSICDXBRIEFPROCEDURE_GEN_ALL_CORE_FT
PROCEDURES:  Arthroscopy, knee, right, with meniscectomy 06-Aug-2021 12:27:18 partial medial and lateral Zeny Preciado  Excision, plica 06-Aug-2021 12:29:36  Zeny Preciado

## 2021-08-06 NOTE — ASU DISCHARGE PLAN (ADULT/PEDIATRIC) - ASU DC SPECIAL INSTRUCTIONSFT
Follow instructions in the attached brochure for bandage care, ice paks, and exercises.  You may walk with full weight on [ Right ]  knee, use a cane / crutches to assist as needed.  Start outpatient physical therapy as prescribed.  See the Surgeon in the office for removal of your stitches in 10-14 days.  Do not drive a car when taking pain medication.  Call the Surgeon for fever, severe pain, or redness around the incisions, fall/injury to operative leg. Please read the pamphlet provided for the care of the right knee.    Follow instructions in the attached brochure for bandage care, ice paks, and exercises.  You may walk with full weight on [ Right ]  knee, use a cane / crutches to assist as needed.  Start outpatient physical therapy as prescribed.  See the Surgeon in the office for removal of your stitches in 10-14 days.  Do not drive a car when taking pain medication.  Call the Surgeon for fever, severe pain, or redness around the incisions, fall/injury to operative leg.    For Constipation :   • Increase your water intake. Drink at least 8 glasses of water daily.  • Try adding fiber to your diet by eating fruits, vegetables and foods that are rich in grains.  • If you do experience constipation, you may take an over-the-counter stool softener/laxative such as Negrita Colace, Senekot or  Milk of Magnesia.

## 2021-08-06 NOTE — PHYSICAL THERAPY INITIAL EVALUATION ADULT - TRANSFER SKILLS, REHAB EVAL
independent
FB removed after digital block without complication. pt tolerated procedure, without residual FB.

## 2021-08-06 NOTE — BRIEF OPERATIVE NOTE - NSICDXBRIEFPREOP_GEN_ALL_CORE_FT
PRE-OP DIAGNOSIS:  Knee derangement, right 06-Aug-2021 12:43:32  Zeny Preciado  Lateral meniscal tear 06-Aug-2021 12:44:32  Zeny Preciado

## 2021-08-06 NOTE — ASU DISCHARGE PLAN (ADULT/PEDIATRIC) - CARE PROVIDER_API CALL
Luca Barroso)  Orthopaedic Surgery  [address], [address]  [place] [address]  Phone: [phone]  Fax: [phone]  Follow Up Time:

## 2021-08-06 NOTE — BRIEF OPERATIVE NOTE - NSICDXBRIEFPOSTOP_GEN_ALL_CORE_FT
POST-OP DIAGNOSIS:  Knee derangement, right 06-Aug-2021 12:46:29  Zeny Preciado  Lateral meniscal tear 06-Aug-2021 12:47:23  Zeny Preciado  Degenerative tear of medial meniscus, right 06-Aug-2021 12:48:03  Zeny Preciado

## 2021-08-16 ENCOUNTER — APPOINTMENT (OUTPATIENT)
Dept: ORTHOPEDIC SURGERY | Facility: CLINIC | Age: 71
End: 2021-08-16
Payer: MEDICARE

## 2021-08-16 VITALS — SYSTOLIC BLOOD PRESSURE: 136 MMHG | HEART RATE: 76 BPM | DIASTOLIC BLOOD PRESSURE: 82 MMHG

## 2021-08-16 DIAGNOSIS — Z98.890 OTHER SPECIFIED POSTPROCEDURAL STATES: ICD-10-CM

## 2021-08-16 PROCEDURE — 99024 POSTOP FOLLOW-UP VISIT: CPT

## 2021-08-16 PROCEDURE — 73562 X-RAY EXAM OF KNEE 3: CPT | Mod: RT

## 2021-08-16 RX ORDER — OXYCODONE 5 MG/1
5 TABLET ORAL
Qty: 24 | Refills: 0 | Status: ACTIVE | COMMUNITY
Start: 2021-08-06

## 2021-08-19 NOTE — PHYSICAL THERAPY INITIAL EVALUATION ADULT - ONSET DATE, REHAB EVAL
Chief Complaint:   Chief Complaint   Patient presents with   • Hand Injury       PPE worn by writer: gloves, gown, face shield/goggles and N95 mask    HPI: Alberta Gerardo is a 24 year old male presents with a chief complaint of pain. The pain is located about the left 5th proximal interphalangeal joint.  The context is that he injured at twice, playing football.  He has associated swelling and pain if he tries to flex at that joint.  Denies numbness, tingling or weakness.  He has not noticed any bruising.  There have been no breaks in the skin.    ROS: Constitutional: No fever.  Neuro: No numbness, tingling or weakness.    SHx:   Social History     Tobacco Use   Smoking Status Not on file       Physical Exam:  Visit Vitals  /78 (BP Location: LUE - Left upper extremity, Patient Position: Sitting, Cuff Size: Regular)   Pulse 96   Temp 96.4 °F (35.8 °C) (Tympanic)   Resp 12   Wt 71 kg   SpO2 99%       General: The patient is normally developed, well kempt and well-nourished.    HEENT:  The conjunctiva are non injected.  No swelling about the lids.  The pupils are equally round and responsive to light and accommodation.  The extraocular muscles are intact.  The sclerae are anicteric.     MUSCULOSKELETAL:  The left 5th finger is without misalignment, deformity or asymmetry.  There is swelling and tenderness to palpation over the left 5th proximal interphalangeal joint. There is decreased range of motion about the drink due to pain limitation.     SKIN:  No rash, lesions, ulcers, abrasions or lacerations noted.  Ecchymosis is not appreciated.    NEUROLOGIC:  No sensory deficits.  Normal proprioception.  The no motor deficits are noted.    PSYCH:  Normal judgment and insight. Affect is full.  The patient's mood is euthymic.  Alert and oriented x 3.    IMAGING: I ordered x-rays of the left hand. I independently read these as nondisplaced fracture of the distal left 5th phalanx best seen on the lateral view.   Associated soft tissue swelling.      IMPRESSION:  1. Closed nondisplaced fracture of proximal phalanx of left little finger, initial encounter    Patient presents with an injury to the left 5th finger.  On exam, he has some swelling and discomfort about the left 5th proximal interphalangeal joint.  X-ray shows a nondisplaced fracture of this joint.  The plan will be to place the patient in a splint.  He is to protect the finger, may apply cool packs on elevate.  He may use anti-inflammatories for pain and swelling.  A referral is placed Orthopedics.  He is given contact information and will call for an appointment.  Printed home care instructions provided.                                  06-Aug-2021

## 2021-09-11 ENCOUNTER — TRANSCRIPTION ENCOUNTER (OUTPATIENT)
Age: 71
End: 2021-09-11

## 2021-12-02 ENCOUNTER — RX RENEWAL (OUTPATIENT)
Age: 71
End: 2021-12-02

## 2021-12-27 ENCOUNTER — APPOINTMENT (OUTPATIENT)
Dept: FAMILY MEDICINE | Facility: CLINIC | Age: 71
End: 2021-12-27
Payer: MEDICARE

## 2021-12-27 VITALS
HEART RATE: 92 BPM | SYSTOLIC BLOOD PRESSURE: 140 MMHG | BODY MASS INDEX: 30.57 KG/M2 | DIASTOLIC BLOOD PRESSURE: 80 MMHG | TEMPERATURE: 97.3 F | WEIGHT: 164 LBS | RESPIRATION RATE: 14 BRPM | OXYGEN SATURATION: 97 % | HEIGHT: 61.5 IN

## 2021-12-27 VITALS — SYSTOLIC BLOOD PRESSURE: 130 MMHG | DIASTOLIC BLOOD PRESSURE: 70 MMHG

## 2021-12-27 DIAGNOSIS — Z01.818 ENCOUNTER FOR OTHER PREPROCEDURAL EXAMINATION: ICD-10-CM

## 2021-12-27 DIAGNOSIS — H26.8 OTHER SPECIFIED CATARACT: ICD-10-CM

## 2021-12-27 PROCEDURE — 99214 OFFICE O/P EST MOD 30 MIN: CPT

## 2021-12-27 NOTE — HISTORY OF PRESENT ILLNESS
[No Pertinent Cardiac History] : no history of aortic stenosis, atrial fibrillation, coronary artery disease, recent myocardial infarction, or implantable device/pacemaker [No Pertinent Pulmonary History] : no history of asthma, COPD, sleep apnea, or smoking [No Adverse Anesthesia Reaction] : no adverse anesthesia reaction in self or family member [(Patient denies any chest pain, claudication, dyspnea on exertion, orthopnea, palpitations or syncope)] : Patient denies any chest pain, claudication, dyspnea on exertion, orthopnea, palpitations or syncope [Chronic Anticoagulation] : no chronic anticoagulation [Chronic Kidney Disease] : no chronic kidney disease [Diabetes] : no diabetes [FreeTextEntry1] : Cataract extraction w/ IOL insertion   [FreeTextEntry2] : 01/10/2021 [FreeTextEntry3] : Dr. Jerome Saenz [FreeTextEntry4] : Presents for clearance for cataract surgery. \par \par Has noticed  a slow progression of vision blurriness.\par \par Overall feels well without any acute complaints.

## 2021-12-27 NOTE — PLAN
[FreeTextEntry1] : RCRI - I \par NO medical contraindications for cataract surgery thus it is an acceptable risk for patient to have procedure performed.

## 2022-02-08 DIAGNOSIS — J06.9 ACUTE UPPER RESPIRATORY INFECTION, UNSPECIFIED: ICD-10-CM

## 2022-05-12 ENCOUNTER — LABORATORY RESULT (OUTPATIENT)
Age: 72
End: 2022-05-12

## 2022-05-12 ENCOUNTER — APPOINTMENT (OUTPATIENT)
Dept: FAMILY MEDICINE | Facility: CLINIC | Age: 72
End: 2022-05-12
Payer: MEDICARE

## 2022-05-12 VITALS
HEART RATE: 76 BPM | DIASTOLIC BLOOD PRESSURE: 70 MMHG | RESPIRATION RATE: 20 BRPM | BODY MASS INDEX: 30.57 KG/M2 | WEIGHT: 164 LBS | HEIGHT: 61.5 IN | SYSTOLIC BLOOD PRESSURE: 122 MMHG

## 2022-05-12 PROCEDURE — G0439: CPT

## 2022-05-12 PROCEDURE — 93000 ELECTROCARDIOGRAM COMPLETE: CPT

## 2022-05-12 PROCEDURE — 36415 COLL VENOUS BLD VENIPUNCTURE: CPT

## 2022-05-12 NOTE — PHYSICAL EXAM
[Normal Posterior Cervical Nodes] : no posterior cervical lymphadenopathy [Normal Anterior Cervical Nodes] : no anterior cervical lymphadenopathy [Normal] : no rash [Coordination Grossly Intact] : coordination grossly intact [No Focal Deficits] : no focal deficits [Normal Gait] : normal gait [Speech Grossly Normal] : speech grossly normal [Memory Grossly Normal] : memory grossly normal [Normal Affect] : the affect was normal [Alert and Oriented x3] : oriented to person, place, and time [Normal Mood] : the mood was normal [Normal Insight/Judgement] : insight and judgment were intact [de-identified] : arthritic nodules noted on digits

## 2022-05-12 NOTE — HISTORY OF PRESENT ILLNESS
[FreeTextEntry1] : Requests comprehensive exam [de-identified] : Presents for comprehensive exam.  States feeling generally well; trying to watch diet and increase activity with goal of wt loss.  Reviewed screening and immunizations and placed orders for updated mammogram/US; also note pt consistently declines vaccines.

## 2022-05-12 NOTE — COUNSELING
[Benefits of weight loss discussed] : Benefits of weight loss discussed [Encouraged to increase physical activity] : Encouraged to increase physical activity [de-identified] : Healthy eating and activities [None] : None [Good understanding] : Patient has a good understanding of lifestyle changes and steps needed to achieve self management goal

## 2022-05-12 NOTE — HEALTH RISK ASSESSMENT
[Never] : Never [No] : In the past 12 months have you used drugs other than those required for medical reasons? No [No falls in past year] : Patient reported no falls in the past year [0] : 2) Feeling down, depressed, or hopeless: Not at all (0) [Fully functional (bathing, dressing, toileting, transferring, walking, feeding)] : Fully functional (bathing, dressing, toileting, transferring, walking, feeding) [Fully functional (using the telephone, shopping, preparing meals, housekeeping, doing laundry, using] : Fully functional and needs no help or supervision to perform IADLs (using the telephone, shopping, preparing meals, housekeeping, doing laundry, using transportation, managing medications and managing finances) [With Patient/Caregiver] : , with patient/caregiver [Reviewed no changes] : Reviewed, no changes [Audit-CScore] : 0 [AdvancecareDate] : 05/22

## 2022-05-13 LAB
ALBUMIN SERPL ELPH-MCNC: 4.6 G/DL
ALP BLD-CCNC: 80 U/L
ALT SERPL-CCNC: 17 U/L
ANION GAP SERPL CALC-SCNC: 14 MMOL/L
APPEARANCE: CLEAR
AST SERPL-CCNC: 23 U/L
BASOPHILS # BLD AUTO: 0.07 K/UL
BASOPHILS NFR BLD AUTO: 1.1 %
BILIRUB SERPL-MCNC: 0.4 MG/DL
BILIRUBIN URINE: NEGATIVE
BLOOD URINE: NEGATIVE
BUN SERPL-MCNC: 17 MG/DL
CALCIUM SERPL-MCNC: 9.6 MG/DL
CHLORIDE SERPL-SCNC: 105 MMOL/L
CHOLEST SERPL-MCNC: 155 MG/DL
CO2 SERPL-SCNC: 23 MMOL/L
COLOR: NORMAL
CREAT SERPL-MCNC: 0.78 MG/DL
EGFR: 81 ML/MIN/1.73M2
EOSINOPHIL # BLD AUTO: 0.26 K/UL
EOSINOPHIL NFR BLD AUTO: 4.1 %
FERRITIN SERPL-MCNC: 159 NG/ML
FOLATE SERPL-MCNC: 18 NG/ML
GLUCOSE QUALITATIVE U: NEGATIVE
GLUCOSE SERPL-MCNC: 97 MG/DL
HCT VFR BLD CALC: 41.9 %
HDLC SERPL-MCNC: 71 MG/DL
HGB BLD-MCNC: 13.3 G/DL
IMM GRANULOCYTES NFR BLD AUTO: 0.3 %
KETONES URINE: NORMAL
LDLC SERPL CALC-MCNC: 72 MG/DL
LEUKOCYTE ESTERASE URINE: NEGATIVE
LYMPHOCYTES # BLD AUTO: 1.59 K/UL
LYMPHOCYTES NFR BLD AUTO: 24.8 %
MAN DIFF?: NORMAL
MCHC RBC-ENTMCNC: 29.6 PG
MCHC RBC-ENTMCNC: 31.7 GM/DL
MCV RBC AUTO: 93.3 FL
MONOCYTES # BLD AUTO: 0.71 K/UL
MONOCYTES NFR BLD AUTO: 11.1 %
NEUTROPHILS # BLD AUTO: 3.76 K/UL
NEUTROPHILS NFR BLD AUTO: 58.6 %
NITRITE URINE: NEGATIVE
NONHDLC SERPL-MCNC: 84 MG/DL
PH URINE: 7
PLATELET # BLD AUTO: 236 K/UL
POTASSIUM SERPL-SCNC: 4.7 MMOL/L
PROT SERPL-MCNC: 6.9 G/DL
PROTEIN URINE: NEGATIVE
RBC # BLD: 4.49 M/UL
RBC # FLD: 14 %
SODIUM SERPL-SCNC: 141 MMOL/L
SPECIFIC GRAVITY URINE: 1.02
T4 FREE SERPL-MCNC: 1.1 NG/DL
TRIGL SERPL-MCNC: 61 MG/DL
TSH SERPL-ACNC: 3.16 UIU/ML
UROBILINOGEN URINE: NORMAL
VIT B12 SERPL-MCNC: 518 PG/ML
WBC # FLD AUTO: 6.41 K/UL

## 2022-06-30 NOTE — H&P PST ADULT - GUM GEN PE MLT EXAM PC
Mom contacted   Concerns about rash   Symptom first observed last night and this morning     Rash location; cheeks, arms, lower legs   Rash described as small, raised bumps. Some redness however mom notes that rash appears to be fading   Itchy     No rash bleeding   No skin peeling   No facial swelling   No breathing difficulty, no swallowing difficulty     No new foods eaten recently   No new soaps, detergents used   Mom states that she used a new sunscreen yesterday     Triage discussed supportive care measures as highlighted in peds triage protocol. Mom to implement to promote comfort and help alleviate symptoms. Can trial hydrocortisone to affected areas that are more red/itchy for relief (not to be applied on the face). Aquaphor to overall moisturize the skin   Avoid excessive sun and heat exposure  If itchiness/discomfort persists triage also discussed using Zyrtec (see protocol). Monitor for relief     Nearest ER promptly if facial swelling arises or if with any breathing/swallowing difficulty - mom is aware. Mom to call peds back sooner if no relief is achieved with supportive care measures, if symptoms appear to be worsening overall, new symptoms develop or if with further concerns or questions   Understanding verbalized.
Mom states pt broke out in a rash, thinking possible allergic reaction.  Please advise
not examined

## 2022-07-18 ENCOUNTER — OUTPATIENT (OUTPATIENT)
Dept: OUTPATIENT SERVICES | Facility: HOSPITAL | Age: 72
LOS: 1 days | End: 2022-07-18
Payer: MEDICARE

## 2022-07-18 ENCOUNTER — APPOINTMENT (OUTPATIENT)
Dept: ULTRASOUND IMAGING | Facility: HOSPITAL | Age: 72
End: 2022-07-18

## 2022-07-18 ENCOUNTER — RESULT REVIEW (OUTPATIENT)
Age: 72
End: 2022-07-18

## 2022-07-18 ENCOUNTER — APPOINTMENT (OUTPATIENT)
Dept: MAMMOGRAPHY | Facility: HOSPITAL | Age: 72
End: 2022-07-18

## 2022-07-18 DIAGNOSIS — Z98.890 OTHER SPECIFIED POSTPROCEDURAL STATES: Chronic | ICD-10-CM

## 2022-07-18 DIAGNOSIS — Z00.8 ENCOUNTER FOR OTHER GENERAL EXAMINATION: ICD-10-CM

## 2022-07-18 DIAGNOSIS — Z98.82 BREAST IMPLANT STATUS: Chronic | ICD-10-CM

## 2022-07-18 PROCEDURE — 77063 BREAST TOMOSYNTHESIS BI: CPT | Mod: 26

## 2022-07-18 PROCEDURE — 76641 ULTRASOUND BREAST COMPLETE: CPT

## 2022-07-18 PROCEDURE — 77063 BREAST TOMOSYNTHESIS BI: CPT

## 2022-07-18 PROCEDURE — 76641 ULTRASOUND BREAST COMPLETE: CPT | Mod: 26,50

## 2022-07-18 PROCEDURE — 77067 SCR MAMMO BI INCL CAD: CPT | Mod: 26

## 2022-07-18 PROCEDURE — 77067 SCR MAMMO BI INCL CAD: CPT

## 2022-11-25 ENCOUNTER — RX RENEWAL (OUTPATIENT)
Age: 72
End: 2022-11-25

## 2023-05-16 ENCOUNTER — APPOINTMENT (OUTPATIENT)
Dept: FAMILY MEDICINE | Facility: CLINIC | Age: 73
End: 2023-05-16
Payer: MEDICARE

## 2023-05-16 VITALS
HEIGHT: 61.5 IN | SYSTOLIC BLOOD PRESSURE: 122 MMHG | BODY MASS INDEX: 31.13 KG/M2 | RESPIRATION RATE: 20 BRPM | WEIGHT: 167 LBS | HEART RATE: 76 BPM | DIASTOLIC BLOOD PRESSURE: 70 MMHG

## 2023-05-16 PROCEDURE — G0439: CPT

## 2023-05-16 PROCEDURE — 93000 ELECTROCARDIOGRAM COMPLETE: CPT

## 2023-05-16 PROCEDURE — 36415 COLL VENOUS BLD VENIPUNCTURE: CPT

## 2023-05-16 NOTE — REVIEW OF SYSTEMS
[Hot Flashes] : hot flashes [Back Pain] : back pain [Negative] : Heme/Lymph [FreeTextEntry9] : as in HPI [FreeTextEntry2] : as in HPI

## 2023-05-16 NOTE — ASSESSMENT
[FreeTextEntry1] : Hemodynamically stable with acceptable BP\par Lab profiles drawn in office and sent\par Referral for Ortho-Spine placed and staff to assist (Dr. Jaimes)\par Advised updated GYN visit to discuss "hot flashes"

## 2023-05-16 NOTE — COUNSELING
[Benefits of weight loss discussed] : Benefits of weight loss discussed [Encouraged to increase physical activity] : Encouraged to increase physical activity [de-identified] : Healthy eating and activities [None] : None [Good understanding] : Patient has a good understanding of lifestyle changes and steps needed to achieve self management goal

## 2023-05-16 NOTE — HISTORY OF PRESENT ILLNESS
[FreeTextEntry1] : Requests comprehensive exam\par  [de-identified] : Presents for comprehensive exam.  Most pressing issues are persistent "hot flashes" and long-standing ongoing low back pain that is now increasingly impacting mobility.  Pt states she is trying to remain active - walking as much as possible - but her back pain is preventing full activity.  Has consulted Ortho in the past but has not had any improvement.  Pt realizes that increased activity would help in weight loss.  Reviewed screening and immunizations - note pt consistently declines vaccines.

## 2023-05-16 NOTE — HEALTH RISK ASSESSMENT
[No] : In the past 12 months have you used drugs other than those required for medical reasons? No [No falls in past year] : Patient reported no falls in the past year [0] : 2) Feeling down, depressed, or hopeless: Not at all (0) [Fully functional (bathing, dressing, toileting, transferring, walking, feeding)] : Fully functional (bathing, dressing, toileting, transferring, walking, feeding) [Fully functional (using the telephone, shopping, preparing meals, housekeeping, doing laundry, using] : Fully functional and needs no help or supervision to perform IADLs (using the telephone, shopping, preparing meals, housekeeping, doing laundry, using transportation, managing medications and managing finances) [With Patient/Caregiver] : , with patient/caregiver [Reviewed no changes] : Reviewed, no changes [Never] : Never [Audit-CScore] : 0 [[id number]] : 0 [AdvancecareDate] : 05/23

## 2023-05-16 NOTE — PHYSICAL EXAM
[Normal Posterior Cervical Nodes] : no posterior cervical lymphadenopathy [Normal Anterior Cervical Nodes] : no anterior cervical lymphadenopathy [Muscle Spasms, Bilateral] : bilateral muscle spasms [Normal] : no rash [Coordination Grossly Intact] : coordination grossly intact [No Focal Deficits] : no focal deficits [Normal Gait] : normal gait [Speech Grossly Normal] : speech grossly normal [Memory Grossly Normal] : memory grossly normal [Normal Affect] : the affect was normal [Normal Mood] : the mood was normal [Alert and Oriented x3] : oriented to person, place, and time [Normal Insight/Judgement] : insight and judgment were intact

## 2023-05-17 LAB
ALBUMIN SERPL ELPH-MCNC: 4.5 G/DL
ALP BLD-CCNC: 67 U/L
ALT SERPL-CCNC: 19 U/L
ANION GAP SERPL CALC-SCNC: 11 MMOL/L
APPEARANCE: CLEAR
AST SERPL-CCNC: 21 U/L
BASOPHILS # BLD AUTO: 0.06 K/UL
BASOPHILS NFR BLD AUTO: 0.9 %
BILIRUB SERPL-MCNC: 0.6 MG/DL
BILIRUBIN URINE: NEGATIVE
BLOOD URINE: NEGATIVE
BUN SERPL-MCNC: 17 MG/DL
CALCIUM SERPL-MCNC: 9.8 MG/DL
CHLORIDE SERPL-SCNC: 107 MMOL/L
CHOLEST SERPL-MCNC: 173 MG/DL
CO2 SERPL-SCNC: 25 MMOL/L
COLOR: YELLOW
CREAT SERPL-MCNC: 0.72 MG/DL
EGFR: 88 ML/MIN/1.73M2
EOSINOPHIL # BLD AUTO: 0.28 K/UL
EOSINOPHIL NFR BLD AUTO: 4.3 %
FOLATE SERPL-MCNC: >20 NG/ML
GLUCOSE QUALITATIVE U: NEGATIVE MG/DL
GLUCOSE SERPL-MCNC: 100 MG/DL
HCT VFR BLD CALC: 42.8 %
HDLC SERPL-MCNC: 75 MG/DL
HGB BLD-MCNC: 12.9 G/DL
IMM GRANULOCYTES NFR BLD AUTO: 0.3 %
KETONES URINE: NEGATIVE MG/DL
LDLC SERPL CALC-MCNC: 84 MG/DL
LEUKOCYTE ESTERASE URINE: NEGATIVE
LYMPHOCYTES # BLD AUTO: 1.44 K/UL
LYMPHOCYTES NFR BLD AUTO: 22 %
MAN DIFF?: NORMAL
MCHC RBC-ENTMCNC: 28.9 PG
MCHC RBC-ENTMCNC: 30.1 GM/DL
MCV RBC AUTO: 95.7 FL
MONOCYTES # BLD AUTO: 0.68 K/UL
MONOCYTES NFR BLD AUTO: 10.4 %
NEUTROPHILS # BLD AUTO: 4.08 K/UL
NEUTROPHILS NFR BLD AUTO: 62.1 %
NITRITE URINE: NEGATIVE
NONHDLC SERPL-MCNC: 97 MG/DL
PH URINE: 7
PLATELET # BLD AUTO: 235 K/UL
POTASSIUM SERPL-SCNC: 5.1 MMOL/L
PROT SERPL-MCNC: 6.8 G/DL
PROTEIN URINE: NEGATIVE MG/DL
RBC # BLD: 4.47 M/UL
RBC # FLD: 14.5 %
SODIUM SERPL-SCNC: 143 MMOL/L
SPECIFIC GRAVITY URINE: 1.02
T4 FREE SERPL-MCNC: 1.2 NG/DL
TRIGL SERPL-MCNC: 67 MG/DL
TSH SERPL-ACNC: 3.01 UIU/ML
UROBILINOGEN URINE: 0.2 MG/DL
VIT B12 SERPL-MCNC: 1163 PG/ML
WBC # FLD AUTO: 6.56 K/UL

## 2023-05-30 ENCOUNTER — APPOINTMENT (OUTPATIENT)
Dept: ORTHOPEDIC SURGERY | Facility: CLINIC | Age: 73
End: 2023-05-30
Payer: MEDICARE

## 2023-05-30 ENCOUNTER — NON-APPOINTMENT (OUTPATIENT)
Age: 73
End: 2023-05-30

## 2023-05-30 VITALS — SYSTOLIC BLOOD PRESSURE: 144 MMHG | HEART RATE: 81 BPM | DIASTOLIC BLOOD PRESSURE: 84 MMHG

## 2023-05-30 DIAGNOSIS — M25.569 PAIN IN UNSPECIFIED KNEE: ICD-10-CM

## 2023-05-30 PROCEDURE — 99215 OFFICE O/P EST HI 40 MIN: CPT

## 2023-05-30 PROCEDURE — 73564 X-RAY EXAM KNEE 4 OR MORE: CPT | Mod: 50

## 2023-06-07 RX ORDER — HYLAN G-F 20 16MG/2ML
16 SYRINGE (ML) INTRAARTICULAR
Qty: 6 | Refills: 0 | Status: ACTIVE | OUTPATIENT
Start: 2023-05-31

## 2023-06-12 ENCOUNTER — APPOINTMENT (OUTPATIENT)
Dept: ORTHOPEDIC SURGERY | Facility: CLINIC | Age: 73
End: 2023-06-12
Payer: MEDICARE

## 2023-06-12 DIAGNOSIS — M54.50 LOW BACK PAIN, UNSPECIFIED: ICD-10-CM

## 2023-06-12 PROCEDURE — 99203 OFFICE O/P NEW LOW 30 MIN: CPT

## 2023-06-12 PROCEDURE — 99213 OFFICE O/P EST LOW 20 MIN: CPT

## 2023-06-12 PROCEDURE — 72110 X-RAY EXAM L-2 SPINE 4/>VWS: CPT

## 2023-06-13 PROBLEM — M54.50 LOW BACK PAIN: Status: ACTIVE | Noted: 2023-05-16

## 2023-06-13 NOTE — DISCUSSION/SUMMARY
[Medication Risks Reviewed] : Medication risks reviewed [Surgical risks reviewed] : Surgical risks reviewed [6 Weeks] : in 6 weeks [de-identified] : Patient will be continuing with physical therapy for core strengthening.\par \par \par I, Dr. Jose Alberto Jaimes, personally performed the evaluation and management (E/M) services for this new patient.  That E/M includes conducting the initial examination, assessing all conditions, and establishing a plan of care.  Today, my ACP, Analia Tan, was here to observe my evaluation and management services for this patient to be followed going forward.

## 2023-06-13 NOTE — PHYSICAL EXAM
[LE] : Sensory: Intact in bilateral lower extremities [Knee] : patellar 2+ and symmetric bilaterally [Ankle] : ankle 2+ and symmetric bilaterally [DP] : dorsalis pedis 2+ and symmetric bilaterally [Normal RLE] : Right Lower Extremity: No scars, rashes, lesions, ulcers, skin intact [Normal LLE] : Left Lower Extremity: No scars, rashes, lesions, ulcers, skin intact [Normal DTR Reflexes] : DTR reflexes normal [Normal Touch] : sensation intact for touch [Normal] : No costovertebral angle tenderness and no spinal tenderness [Poor Appearance] : well-appearing [Acute Distress] : not in acute distress [Obese] : not obese [de-identified] : Negative FAbERs\par No muscular atrophy of lower extremities.\par Positive Arthur's sign of the right lower extremity. [de-identified] : Heel and foot walk exam is intact\par Negative tension sign of bilateral lower extremities [de-identified] : X-rays of the lumbar spine 4 views demonstrating good alignment on AP view.\par Lateral views of flexion and extension with no instability.  A significant decrease in disc height at L5-S1.\par No obvious fractures, no bony tumors, no infection.\par \par \par MRI of the lumbar spine done on November 9, 2021\par No evidence of herniation\par Degenerative spondylitic changes.\par Right renal cyst with a recommendation for patient to follow-up with this finding with her medical physician, patient states that it has not been addressed in the past.

## 2023-06-13 NOTE — HISTORY OF PRESENT ILLNESS
[Pain Location] : pain [Lumbar] : lumbar region [] : right foot [Worsening] : worsening [___ yrs] : [unfilled] year(s) ago [0] : a current pain level of 0/10 [10] : a maximum pain level of 10/10 [Intermit.] : ~He/She~ states the symptoms seem to be intermittent [Bending] : worsened by bending [Lifting] : worsened by lifting [Prolonged Standing] : worsened by prolonged standing [Walking] : worsened by walking [de-identified] : A 72 year old female presents an initial visit for low back pain.  Patient with complaints of low back pain due to a fall that she sustained back in 1997 while she was trying to put on her pants by a [employer] there well and her heel caught inside the pants patient fell down the staircase.  Since then patient has had lumbar sacral pain that has been intermittent does complain of numbness that does occur on the right foot into the right great toe, as well as electrical sciatic pain on the right side.\par Patient has already undergone several treatments of physical therapy with mild and temporary relief of the pain.\par Patient has been prescribed meloxicam which she takes as needed and has helped with her days of severe pain of the right leg and SI joint.\par Patient has undergone an MRI of the lumbar spine without contrast done on November 9, 2021.

## 2023-06-14 ENCOUNTER — APPOINTMENT (OUTPATIENT)
Dept: ORTHOPEDIC SURGERY | Facility: CLINIC | Age: 73
End: 2023-06-14
Payer: MEDICARE

## 2023-06-14 ENCOUNTER — APPOINTMENT (OUTPATIENT)
Dept: FAMILY MEDICINE | Facility: CLINIC | Age: 73
End: 2023-06-14
Payer: MEDICARE

## 2023-06-14 VITALS — SYSTOLIC BLOOD PRESSURE: 146 MMHG | DIASTOLIC BLOOD PRESSURE: 79 MMHG | HEART RATE: 75 BPM

## 2023-06-14 VITALS — RESPIRATION RATE: 20 BRPM | SYSTOLIC BLOOD PRESSURE: 122 MMHG | HEART RATE: 76 BPM | DIASTOLIC BLOOD PRESSURE: 70 MMHG

## 2023-06-14 DIAGNOSIS — Q61.02 CONGENITAL MULTIPLE RENAL CYSTS: ICD-10-CM

## 2023-06-14 PROCEDURE — 20611 DRAIN/INJ JOINT/BURSA W/US: CPT | Mod: 50

## 2023-06-14 PROCEDURE — 99213 OFFICE O/P EST LOW 20 MIN: CPT

## 2023-06-14 RX ORDER — HYLAN G-F 20 16MG/2ML
16 SYRINGE (ML) INTRAARTICULAR
Refills: 0 | Status: COMPLETED | OUTPATIENT
Start: 2023-06-14

## 2023-06-14 RX ADMIN — Medication 2 MG/2ML: at 00:00

## 2023-06-14 NOTE — HISTORY OF PRESENT ILLNESS
[de-identified] : Presents over concern about newly noted renal cysts.  Pt had MRI of lumbar spine per Ortho; incidental finding of R renal cysts.  Pt has no referable symptoms; recent labs revealed normal renal function.

## 2023-06-14 NOTE — PHYSICAL EXAM
[No Acute Distress] : no acute distress [Normal] : normal rate, regular rhythm, normal S1 and S2 and no murmur heard [No Edema] : there was no peripheral edema [Soft] : abdomen soft [Non Tender] : non-tender [No CVA Tenderness] : no CVA  tenderness [No Focal Deficits] : no focal deficits [Alert and Oriented x3] : oriented to person, place, and time

## 2023-06-14 NOTE — ASSESSMENT
[FreeTextEntry1] : Renal cysts as incidental finding - per Radiology recommendation renal US ordered - staff to assist.

## 2023-06-21 ENCOUNTER — APPOINTMENT (OUTPATIENT)
Dept: ORTHOPEDIC SURGERY | Facility: CLINIC | Age: 73
End: 2023-06-21
Payer: MEDICARE

## 2023-06-21 PROCEDURE — 20611 DRAIN/INJ JOINT/BURSA W/US: CPT | Mod: 50

## 2023-06-21 RX ORDER — HYLAN G-F 20 16MG/2ML
16 SYRINGE (ML) INTRAARTICULAR
Refills: 0 | Status: COMPLETED | OUTPATIENT
Start: 2023-06-21

## 2023-06-21 RX ADMIN — Medication 2 MG/2ML: at 00:00

## 2023-06-21 NOTE — HISTORY OF PRESENT ILLNESS
[de-identified] : This patient presents today for the second Synvisc injection to the knees.  Patient had the first injection last week without difficulty.  Currently she has no pain she is feeling better after the last injections.  She is able to go up and down the stairs easier since the injection.  No adverse effects noted from the injections.

## 2023-06-21 NOTE — DISCUSSION/SUMMARY
[de-identified] : The injection was tolerated without difficulty.  Instructions were given postinjection regarding ice, analgesics, and modification of activities .  I will see the patient back in one week for the next injection.

## 2023-06-21 NOTE — PHYSICAL EXAM
[de-identified] : The patient appears well nourished  and in no apparent distress.  The patient is alert and oriented to person, place, and time.   Affect and mood appear normal.    The head is normocephalic and atraumatic.  The eyes reveal normal sclera and extra ocular muscles are intact.   The neck appears normal with no jugular venous distention or masses noted.   Skin shows normal turgor with no evidence of eczema or psoriasis.  No respiratory distress noted.  The patient ambulates with antalgic gait.\par \par The right and left knees have been affected range of motion from 0 to 130 degrees.  There is pain with terminal flexion of both knees left worse than the right.  There is crepitations with motion.  There is some tenderness about the patellofemoral joints bilaterally..  There is no effusion.  There is no difficult medial or lateral joint line tenderness .   There is a negative [facility] sign.  There is no soft tissue swelling, warmth, or erythema.   There is a negative Lachman sign.  There is no instability to varus/valgus stress.  There is no instability to anterior/posterior drawer.  There is normal strength  in the quadriceps and hamstring muscles.  Strength and sensation are intact distally.  There are normal pulses distally and good capillary refill.  No edema or lymphadenopathy noted.  \par

## 2023-06-21 NOTE — PROCEDURE
[de-identified] : A 22gauge needle was sterilely placed onto the syringe of Synvisc the right and left knees were then sterilely prepped with chlorhexidine and ethylene chloride spray was used as an anesthetic just prior to the injection.  Under ultrasound guidance utilizing the [employer]ace[employer] Lumify ultrasound probe the Synvisc was injected into the each knee joint just above and lateral to the patella into the suprapatellar pouch.  Placement of the injection into the suprapatellar pouch was confirmed by ultrasound and a spot image was saved.  The patient tolerated the procedure well without difficulty.  The patient was given instructions on the use of ice and anti-inflammatories for post injection site soreness.

## 2023-06-21 NOTE — REASON FOR VISIT
[Follow-Up Visit] : a follow-up visit for [FreeTextEntry2] : bilateral knee pain here for synvisc #2

## 2023-06-21 NOTE — REVIEW OF SYSTEMS
[Arthralgia] : no arthralgia [Joint Pain] : no joint pain [Joint Stiffness] : no joint stiffness [Joint Swelling] : no joint swelling

## 2023-06-28 ENCOUNTER — APPOINTMENT (OUTPATIENT)
Dept: ORTHOPEDIC SURGERY | Facility: CLINIC | Age: 73
End: 2023-06-28
Payer: MEDICARE

## 2023-06-28 VITALS — HEART RATE: 74 BPM | DIASTOLIC BLOOD PRESSURE: 81 MMHG | SYSTOLIC BLOOD PRESSURE: 149 MMHG

## 2023-06-28 DIAGNOSIS — M17.11 UNILATERAL PRIMARY OSTEOARTHRITIS, RIGHT KNEE: ICD-10-CM

## 2023-06-28 DIAGNOSIS — M17.12 UNILATERAL PRIMARY OSTEOARTHRITIS, LEFT KNEE: ICD-10-CM

## 2023-06-28 PROCEDURE — 20611 DRAIN/INJ JOINT/BURSA W/US: CPT | Mod: 50

## 2023-06-28 RX ORDER — HYLAN G-F 20 16MG/2ML
16 SYRINGE (ML) INTRAARTICULAR
Refills: 0 | Status: COMPLETED | OUTPATIENT
Start: 2023-06-28

## 2023-06-28 RX ADMIN — Medication 2 MG/2ML: at 00:00

## 2023-06-28 NOTE — DISCUSSION/SUMMARY
[de-identified] : This patient presents for the last Synvisc injection to the knees.  The injections were performed under sterile conditions without difficulty.  Instructions are given postinjection for ice analgesics and modification of activities.  I will see the patient back in the office if she has recurrence of symptoms.

## 2023-06-28 NOTE — PHYSICAL EXAM
[de-identified] : The patient appears well nourished  and in no apparent distress.  The patient is alert and oriented to person, place, and time.   Affect and mood appear normal.    The head is normocephalic and atraumatic.  The eyes reveal normal sclera and extra ocular muscles are intact.   The neck appears normal with no jugular venous distention or masses noted.   Skin shows normal turgor with no evidence of eczema or psoriasis.  No respiratory distress noted.  The patient ambulates with antalgic gait.\par \par The right and left knees have been affected range of motion from 0 to 130 degrees.  There is pain with terminal flexion of both knees left worse than the right.  There is crepitations with motion.  There is some tenderness about the patellofemoral joints bilaterally..  There is no effusion.  There is no difficult medial or lateral joint line tenderness .   There is a negative [facility] sign.  There is no soft tissue swelling, warmth, or erythema.   There is a negative Lachman sign.  There is no instability to varus/valgus stress.  There is no instability to anterior/posterior drawer.  There is normal strength  in the quadriceps and hamstring muscles.  Strength and sensation are intact distally.  There are normal pulses distally and good capillary refill.  No edema or lymphadenopathy noted.  \par

## 2023-06-28 NOTE — REVIEW OF SYSTEMS
[Joint Stiffness] : joint stiffness [Joint Swelling] : joint swelling [Arthralgia] : no arthralgia [Joint Pain] : no joint pain

## 2023-06-28 NOTE — PROCEDURE
[de-identified] : A 22gauge needle was sterilely placed onto the syringe of Synvisc the right and left knees were then sterilely prepped with chlorhexidine and ethylene chloride spray was used as an anesthetic just prior to the injection.  Under ultrasound guidance utilizing the [employer]ace[employer] Lumify ultrasound probe the Synvisc was injected into the each knee joint just above and lateral to the patella into the suprapatellar pouch.  Placement of the injection into the suprapatellar pouch was confirmed by ultrasound and a spot image was saved.  The patient tolerated the procedure well without difficulty.  The patient was given instructions on the use of ice and anti-inflammatories for post injection site soreness.

## 2023-06-28 NOTE — HISTORY OF PRESENT ILLNESS
[de-identified] : This patient presents today for follow-up regarding osteoarthritis about the knees.  She presents for the third Synvisc injections in her knees.  Currently she has no pain about the knees.  She is having some stiffness and clicking with activity.  She denies any adverse effects after the last injections.

## 2023-06-28 NOTE — REASON FOR VISIT
[Follow-Up Visit] : a follow-up visit for [FreeTextEntry2] : primary osteoarthritis of bilateral knees here for synvisc #3

## 2023-06-30 ENCOUNTER — APPOINTMENT (OUTPATIENT)
Dept: ULTRASOUND IMAGING | Facility: HOSPITAL | Age: 73
End: 2023-06-30
Payer: MEDICARE

## 2023-06-30 ENCOUNTER — OUTPATIENT (OUTPATIENT)
Dept: OUTPATIENT SERVICES | Facility: HOSPITAL | Age: 73
LOS: 1 days | End: 2023-06-30
Payer: MEDICARE

## 2023-06-30 DIAGNOSIS — Z98.82 BREAST IMPLANT STATUS: Chronic | ICD-10-CM

## 2023-06-30 DIAGNOSIS — Z98.890 OTHER SPECIFIED POSTPROCEDURAL STATES: Chronic | ICD-10-CM

## 2023-06-30 DIAGNOSIS — Q61.02 CONGENITAL MULTIPLE RENAL CYSTS: ICD-10-CM

## 2023-06-30 PROCEDURE — 76775 US EXAM ABDO BACK WALL LIM: CPT

## 2023-06-30 PROCEDURE — 76775 US EXAM ABDO BACK WALL LIM: CPT | Mod: 26

## 2023-09-05 ENCOUNTER — RX RENEWAL (OUTPATIENT)
Age: 73
End: 2023-09-05

## 2023-09-05 RX ORDER — SIMVASTATIN 10 MG/1
10 TABLET, FILM COATED ORAL
Qty: 90 | Refills: 3 | Status: ACTIVE | COMMUNITY
Start: 2020-12-12 | End: 1900-01-01

## 2023-10-11 RX ORDER — AZITHROMYCIN 250 MG/1
250 TABLET, FILM COATED ORAL
Qty: 1 | Refills: 0 | Status: ACTIVE | COMMUNITY
Start: 2022-02-08 | End: 1900-01-01

## 2024-05-19 ENCOUNTER — NON-APPOINTMENT (OUTPATIENT)
Age: 74
End: 2024-05-19

## 2024-05-20 ENCOUNTER — LABORATORY RESULT (OUTPATIENT)
Age: 74
End: 2024-05-20

## 2024-05-20 ENCOUNTER — APPOINTMENT (OUTPATIENT)
Dept: FAMILY MEDICINE | Facility: CLINIC | Age: 74
End: 2024-05-20
Payer: MEDICARE

## 2024-05-20 VITALS
HEIGHT: 62 IN | HEART RATE: 76 BPM | DIASTOLIC BLOOD PRESSURE: 65 MMHG | WEIGHT: 162 LBS | SYSTOLIC BLOOD PRESSURE: 125 MMHG | RESPIRATION RATE: 20 BRPM | BODY MASS INDEX: 29.81 KG/M2

## 2024-05-20 DIAGNOSIS — E78.5 HYPERLIPIDEMIA, UNSPECIFIED: ICD-10-CM

## 2024-05-20 DIAGNOSIS — Z00.00 ENCOUNTER FOR GENERAL ADULT MEDICAL EXAMINATION W/OUT ABNORMAL FINDINGS: ICD-10-CM

## 2024-05-20 DIAGNOSIS — R82.81 PYURIA: ICD-10-CM

## 2024-05-20 PROCEDURE — 93000 ELECTROCARDIOGRAM COMPLETE: CPT

## 2024-05-20 PROCEDURE — G0439: CPT

## 2024-05-20 PROCEDURE — 36415 COLL VENOUS BLD VENIPUNCTURE: CPT

## 2024-05-20 RX ORDER — MELOXICAM 15 MG/1
15 TABLET ORAL
Qty: 90 | Refills: 3 | Status: ACTIVE | COMMUNITY
Start: 2023-05-30 | End: 1900-01-01

## 2024-05-20 NOTE — HEALTH RISK ASSESSMENT
[No] : In the past 12 months have you used drugs other than those required for medical reasons? No [No falls in past year] : Patient reported no falls in the past year [0] : 2) Feeling down, depressed, or hopeless: Not at all (0) [Audit-CScore] : 0 [[id number]] : 0 [Never] : Never [Fully functional (bathing, dressing, toileting, transferring, walking, feeding)] : Fully functional (bathing, dressing, toileting, transferring, walking, feeding) [Fully functional (using the telephone, shopping, preparing meals, housekeeping, doing laundry, using] : Fully functional and needs no help or supervision to perform IADLs (using the telephone, shopping, preparing meals, housekeeping, doing laundry, using transportation, managing medications and managing finances) [With Patient/Caregiver] : , with patient/caregiver [Reviewed no changes] : Reviewed, no changes [AdvancecareDate] : 05/24

## 2024-05-20 NOTE — PHYSICAL EXAM
[Normal Posterior Cervical Nodes] : no posterior cervical lymphadenopathy [Normal Anterior Cervical Nodes] : no anterior cervical lymphadenopathy [Normal] : no joint swelling and grossly normal strength and tone [No Rash] : no rash [Coordination Grossly Intact] : coordination grossly intact [No Focal Deficits] : no focal deficits [Normal Gait] : normal gait [Speech Grossly Normal] : speech grossly normal [Memory Grossly Normal] : memory grossly normal [Normal Affect] : the affect was normal [Alert and Oriented x3] : oriented to person, place, and time [Normal Mood] : the mood was normal [Normal Insight/Judgement] : insight and judgment were intact [de-identified] : few scattered seborrheic keratoses

## 2024-05-20 NOTE — HISTORY OF PRESENT ILLNESS
[FreeTextEntry1] : Requests comprehensive exam  [de-identified] : Presents for comprehensive exam.  States feeling generally well; trying to maintain a healthy diet and remain active.  Reviewed screening and placed an order for an updated mammogram.

## 2024-05-20 NOTE — COUNSELING
[de-identified] : Healthy eating and activities [None] : None [Good understanding] : Patient has a good understanding of lifestyle changes and steps needed to achieve self management goal

## 2024-05-21 PROBLEM — R82.81 PYURIA: Status: ACTIVE | Noted: 2024-05-21 | Resolved: 2024-06-20

## 2024-05-21 LAB
ALBUMIN SERPL ELPH-MCNC: 4.4 G/DL
ALP BLD-CCNC: 68 U/L
ALT SERPL-CCNC: 15 U/L
ANION GAP SERPL CALC-SCNC: 13 MMOL/L
APPEARANCE: CLEAR
AST SERPL-CCNC: 18 U/L
BILIRUB SERPL-MCNC: 0.4 MG/DL
BILIRUBIN URINE: NEGATIVE
BLOOD URINE: NEGATIVE
BUN SERPL-MCNC: 20 MG/DL
CALCIUM SERPL-MCNC: 9.7 MG/DL
CHLORIDE SERPL-SCNC: 105 MMOL/L
CHOLEST SERPL-MCNC: 160 MG/DL
CO2 SERPL-SCNC: 22 MMOL/L
COLOR: YELLOW
CREAT SERPL-MCNC: 0.79 MG/DL
EGFR: 78 ML/MIN/1.73M2
ESTIMATED AVERAGE GLUCOSE: 114 MG/DL
FERRITIN SERPL-MCNC: 288 NG/ML
FOLATE SERPL-MCNC: >20 NG/ML
GLUCOSE QUALITATIVE U: NEGATIVE MG/DL
GLUCOSE SERPL-MCNC: 103 MG/DL
HBA1C MFR BLD HPLC: 5.6 %
HCT VFR BLD CALC: 39 %
HDLC SERPL-MCNC: 69 MG/DL
HGB BLD-MCNC: 12.3 G/DL
IRON SATN MFR SERPL: 28 %
IRON SERPL-MCNC: 85 UG/DL
KETONES URINE: NEGATIVE MG/DL
LDLC SERPL CALC-MCNC: 77 MG/DL
LEUKOCYTE ESTERASE URINE: ABNORMAL
MCHC RBC-ENTMCNC: 28.6 PG
MCHC RBC-ENTMCNC: 31.5 GM/DL
MCV RBC AUTO: 90.7 FL
NITRITE URINE: POSITIVE
NONHDLC SERPL-MCNC: 91 MG/DL
PH URINE: 7
PLATELET # BLD AUTO: 258 K/UL
POTASSIUM SERPL-SCNC: 4.4 MMOL/L
PROT SERPL-MCNC: 6.9 G/DL
PROTEIN URINE: NEGATIVE MG/DL
RBC # BLD: 4.3 M/UL
RBC # FLD: 14.9 %
SODIUM SERPL-SCNC: 140 MMOL/L
SPECIFIC GRAVITY URINE: 1.02
T4 FREE SERPL-MCNC: 1.3 NG/DL
TIBC SERPL-MCNC: 304 UG/DL
TRIGL SERPL-MCNC: 74 MG/DL
TSH SERPL-ACNC: 2.91 UIU/ML
UIBC SERPL-MCNC: 218 UG/DL
UROBILINOGEN URINE: 0.2 MG/DL
VIT B12 SERPL-MCNC: >2000 PG/ML
WBC # FLD AUTO: 6.26 K/UL

## 2024-05-23 DIAGNOSIS — N39.0 URINARY TRACT INFECTION, SITE NOT SPECIFIED: ICD-10-CM

## 2024-05-23 LAB
APPEARANCE: CLEAR
BACTERIA: ABNORMAL /HPF
BILIRUBIN URINE: NEGATIVE
BLOOD URINE: NEGATIVE
CAST: 1 /LPF
COLOR: YELLOW
EPITHELIAL CELLS: 2 /HPF
GLUCOSE QUALITATIVE U: NEGATIVE MG/DL
KETONES URINE: NEGATIVE MG/DL
LEUKOCYTE ESTERASE URINE: ABNORMAL
MICROSCOPIC-UA: NORMAL
NITRITE URINE: POSITIVE
PH URINE: 6.5
PROTEIN URINE: NEGATIVE MG/DL
RED BLOOD CELLS URINE: 1 /HPF
SPECIFIC GRAVITY URINE: 1.02
UROBILINOGEN URINE: 0.2 MG/DL
WHITE BLOOD CELLS URINE: 9 /HPF

## 2024-05-23 RX ORDER — SULFAMETHOXAZOLE AND TRIMETHOPRIM 800; 160 MG/1; MG/1
800-160 TABLET ORAL TWICE DAILY
Qty: 14 | Refills: 0 | Status: ACTIVE | COMMUNITY
Start: 2024-05-23 | End: 1900-01-01

## 2024-05-24 LAB — BACTERIA UR CULT: ABNORMAL

## 2024-06-19 ENCOUNTER — APPOINTMENT (OUTPATIENT)
Dept: MAMMOGRAPHY | Facility: HOSPITAL | Age: 74
End: 2024-06-19
Payer: MEDICARE

## 2024-06-19 ENCOUNTER — OUTPATIENT (OUTPATIENT)
Dept: OUTPATIENT SERVICES | Facility: HOSPITAL | Age: 74
LOS: 1 days | End: 2024-06-19
Payer: MEDICARE

## 2024-06-19 ENCOUNTER — RESULT REVIEW (OUTPATIENT)
Age: 74
End: 2024-06-19

## 2024-06-19 ENCOUNTER — APPOINTMENT (OUTPATIENT)
Dept: RADIOLOGY | Facility: HOSPITAL | Age: 74
End: 2024-06-19
Payer: MEDICARE

## 2024-06-19 ENCOUNTER — APPOINTMENT (OUTPATIENT)
Dept: ULTRASOUND IMAGING | Facility: HOSPITAL | Age: 74
End: 2024-06-19
Payer: MEDICARE

## 2024-06-19 DIAGNOSIS — Z98.890 OTHER SPECIFIED POSTPROCEDURAL STATES: Chronic | ICD-10-CM

## 2024-06-19 DIAGNOSIS — Z00.8 ENCOUNTER FOR OTHER GENERAL EXAMINATION: ICD-10-CM

## 2024-06-19 DIAGNOSIS — Z98.82 BREAST IMPLANT STATUS: Chronic | ICD-10-CM

## 2024-06-19 PROCEDURE — 77080 DXA BONE DENSITY AXIAL: CPT | Mod: 26

## 2024-06-19 PROCEDURE — 77063 BREAST TOMOSYNTHESIS BI: CPT | Mod: 26

## 2024-06-19 PROCEDURE — 77067 SCR MAMMO BI INCL CAD: CPT | Mod: 26

## 2024-06-19 PROCEDURE — 77063 BREAST TOMOSYNTHESIS BI: CPT

## 2024-06-19 PROCEDURE — 76641 ULTRASOUND BREAST COMPLETE: CPT | Mod: 26,50

## 2024-06-19 PROCEDURE — 77080 DXA BONE DENSITY AXIAL: CPT

## 2024-06-19 PROCEDURE — 76641 ULTRASOUND BREAST COMPLETE: CPT

## 2024-06-19 PROCEDURE — 77067 SCR MAMMO BI INCL CAD: CPT

## 2024-06-20 ENCOUNTER — NON-APPOINTMENT (OUTPATIENT)
Age: 74
End: 2024-06-20

## 2024-06-21 ENCOUNTER — NON-APPOINTMENT (OUTPATIENT)
Age: 74
End: 2024-06-21

## 2024-08-30 NOTE — ASU PATIENT PROFILE, ADULT - SPIRITUAL CULTURAL, CURRENT SITUATION, PROFILE
\"Have you been to the ER, urgent care clinic since your last visit?  Hospitalized since your last visit?\"    NO    “Have you seen or consulted any other health care providers outside of [facility] since your last visit?”    NO    “Have you had a colorectal cancer screening such as a colonoscopy/FIT/Cologuard?    NO    No colonoscopy on file  No cologuard on file  No FIT/FOBT on file   No flexible sigmoidoscopy on file         “Have you had a pap smear?”    NO    No cervical cancer screening on file           none

## 2024-11-06 ENCOUNTER — RX RENEWAL (OUTPATIENT)
Age: 74
End: 2024-11-06

## 2024-11-22 ENCOUNTER — APPOINTMENT (OUTPATIENT)
Dept: GASTROENTEROLOGY | Facility: CLINIC | Age: 74
End: 2024-11-22
Payer: MEDICARE

## 2024-11-22 VITALS
DIASTOLIC BLOOD PRESSURE: 83 MMHG | SYSTOLIC BLOOD PRESSURE: 134 MMHG | TEMPERATURE: 97.9 F | HEIGHT: 62 IN | BODY MASS INDEX: 30 KG/M2 | WEIGHT: 163 LBS | RESPIRATION RATE: 16 BRPM | OXYGEN SATURATION: 98 % | HEART RATE: 81 BPM

## 2024-11-22 DIAGNOSIS — Z87.898 PERSONAL HISTORY OF OTHER SPECIFIED CONDITIONS: ICD-10-CM

## 2024-11-22 DIAGNOSIS — Z86.0101 PERSONAL HISTORY OF ADENOMATOUS AND SERRATED COLON POLYPS: ICD-10-CM

## 2024-11-22 DIAGNOSIS — K59.00 CONSTIPATION, UNSPECIFIED: ICD-10-CM

## 2024-11-22 PROCEDURE — 99204 OFFICE O/P NEW MOD 45 MIN: CPT

## 2024-11-22 RX ORDER — SODIUM SULFATE, MAGNESIUM SULFATE, AND POTASSIUM CHLORIDE 17.75; 2.7; 2.25 G/1; G/1; G/1
1479-225-188 TABLET ORAL
Qty: 12 | Refills: 0 | Status: ACTIVE | COMMUNITY
Start: 2024-11-22 | End: 1900-01-01

## 2024-11-27 ENCOUNTER — NON-APPOINTMENT (OUTPATIENT)
Age: 74
End: 2024-11-27

## 2024-12-04 ENCOUNTER — OUTPATIENT (OUTPATIENT)
Dept: OUTPATIENT SERVICES | Facility: HOSPITAL | Age: 74
LOS: 1 days | End: 2024-12-04
Payer: MEDICARE

## 2024-12-04 ENCOUNTER — RESULT REVIEW (OUTPATIENT)
Age: 74
End: 2024-12-04

## 2024-12-04 ENCOUNTER — APPOINTMENT (OUTPATIENT)
Dept: GASTROENTEROLOGY | Facility: HOSPITAL | Age: 74
End: 2024-12-04

## 2024-12-04 DIAGNOSIS — Z98.890 OTHER SPECIFIED POSTPROCEDURAL STATES: Chronic | ICD-10-CM

## 2024-12-04 DIAGNOSIS — Z86.010 PERSONAL HISTORY OF COLON POLYPS: ICD-10-CM

## 2024-12-04 DIAGNOSIS — Z98.82 BREAST IMPLANT STATUS: Chronic | ICD-10-CM

## 2024-12-04 PROCEDURE — 45380 COLONOSCOPY AND BIOPSY: CPT | Mod: PT

## 2024-12-04 PROCEDURE — 88305 TISSUE EXAM BY PATHOLOGIST: CPT

## 2024-12-04 PROCEDURE — 45380 COLONOSCOPY AND BIOPSY: CPT

## 2024-12-04 PROCEDURE — 88305 TISSUE EXAM BY PATHOLOGIST: CPT | Mod: 26

## 2024-12-04 RX ORDER — SODIUM CHLORIDE 9 MG/ML
500 INJECTION, SOLUTION INTRAMUSCULAR; INTRAVENOUS; SUBCUTANEOUS
Refills: 0 | Status: COMPLETED | OUTPATIENT
Start: 2024-12-04 | End: 2024-12-04

## 2024-12-04 RX ADMIN — SODIUM CHLORIDE 75 MILLILITER(S): 9 INJECTION, SOLUTION INTRAMUSCULAR; INTRAVENOUS; SUBCUTANEOUS at 16:01

## 2024-12-06 LAB — SURGICAL PATHOLOGY STUDY: SIGNIFICANT CHANGE UP

## 2025-05-20 ENCOUNTER — NON-APPOINTMENT (OUTPATIENT)
Age: 75
End: 2025-05-20

## 2025-05-22 ENCOUNTER — APPOINTMENT (OUTPATIENT)
Dept: FAMILY MEDICINE | Facility: CLINIC | Age: 75
End: 2025-05-22
Payer: MEDICARE

## 2025-05-22 VITALS
SYSTOLIC BLOOD PRESSURE: 126 MMHG | DIASTOLIC BLOOD PRESSURE: 70 MMHG | RESPIRATION RATE: 20 BRPM | BODY MASS INDEX: 30.18 KG/M2 | HEIGHT: 62 IN | WEIGHT: 164 LBS | HEART RATE: 76 BPM

## 2025-05-22 DIAGNOSIS — E78.5 HYPERLIPIDEMIA, UNSPECIFIED: ICD-10-CM

## 2025-05-22 DIAGNOSIS — Z00.00 ENCOUNTER FOR GENERAL ADULT MEDICAL EXAMINATION W/OUT ABNORMAL FINDINGS: ICD-10-CM

## 2025-05-22 PROCEDURE — G0439: CPT

## 2025-05-22 PROCEDURE — 93000 ELECTROCARDIOGRAM COMPLETE: CPT

## 2025-05-22 PROCEDURE — 36415 COLL VENOUS BLD VENIPUNCTURE: CPT

## 2025-05-23 LAB
25(OH)D3 SERPL-MCNC: 32.2 NG/ML
ALBUMIN SERPL ELPH-MCNC: 4.5 G/DL
ALP BLD-CCNC: 72 U/L
ALT SERPL-CCNC: 19 U/L
ANION GAP SERPL CALC-SCNC: 14 MMOL/L
AST SERPL-CCNC: 22 U/L
BILIRUB SERPL-MCNC: 0.5 MG/DL
BUN SERPL-MCNC: 24 MG/DL
CALCIUM SERPL-MCNC: 9.8 MG/DL
CHLORIDE SERPL-SCNC: 103 MMOL/L
CHOLEST SERPL-MCNC: 169 MG/DL
CO2 SERPL-SCNC: 24 MMOL/L
CREAT SERPL-MCNC: 0.76 MG/DL
EGFRCR SERPLBLD CKD-EPI 2021: 82 ML/MIN/1.73M2
ESTIMATED AVERAGE GLUCOSE: 114 MG/DL
FERRITIN SERPL-MCNC: 196 NG/ML
FOLATE SERPL-MCNC: 7.5 NG/ML
GLUCOSE SERPL-MCNC: 96 MG/DL
HBA1C MFR BLD HPLC: 5.6 %
HCT VFR BLD CALC: 39.7 %
HDLC SERPL-MCNC: 71 MG/DL
HGB BLD-MCNC: 12.9 G/DL
LDLC SERPL-MCNC: 84 MG/DL
MCHC RBC-ENTMCNC: 29.9 PG
MCHC RBC-ENTMCNC: 32.5 G/DL
MCV RBC AUTO: 91.9 FL
NONHDLC SERPL-MCNC: 98 MG/DL
PLATELET # BLD AUTO: 237 K/UL
POTASSIUM SERPL-SCNC: 4.9 MMOL/L
PROT SERPL-MCNC: 7 G/DL
RBC # BLD: 4.32 M/UL
RBC # FLD: 13.4 %
SODIUM SERPL-SCNC: 141 MMOL/L
T4 FREE SERPL-MCNC: 1.2 NG/DL
TRIGL SERPL-MCNC: 73 MG/DL
TSH SERPL-ACNC: 2.95 UIU/ML
VIT B12 SERPL-MCNC: 678 PG/ML
WBC # FLD AUTO: 6.96 K/UL

## 2025-08-06 ENCOUNTER — RESULT REVIEW (OUTPATIENT)
Age: 75
End: 2025-08-06

## 2025-08-06 ENCOUNTER — APPOINTMENT (OUTPATIENT)
Dept: ULTRASOUND IMAGING | Facility: HOSPITAL | Age: 75
End: 2025-08-06
Payer: MEDICARE

## 2025-08-06 ENCOUNTER — APPOINTMENT (OUTPATIENT)
Dept: MAMMOGRAPHY | Facility: HOSPITAL | Age: 75
End: 2025-08-06
Payer: MEDICARE

## 2025-08-06 ENCOUNTER — OUTPATIENT (OUTPATIENT)
Dept: OUTPATIENT SERVICES | Facility: HOSPITAL | Age: 75
LOS: 1 days | End: 2025-08-06
Payer: MEDICARE

## 2025-08-06 DIAGNOSIS — Z98.890 OTHER SPECIFIED POSTPROCEDURAL STATES: Chronic | ICD-10-CM

## 2025-08-06 DIAGNOSIS — Z00.8 ENCOUNTER FOR OTHER GENERAL EXAMINATION: ICD-10-CM

## 2025-08-06 DIAGNOSIS — Z98.82 BREAST IMPLANT STATUS: Chronic | ICD-10-CM

## 2025-08-06 PROCEDURE — 77067 SCR MAMMO BI INCL CAD: CPT

## 2025-08-06 PROCEDURE — 77063 BREAST TOMOSYNTHESIS BI: CPT | Mod: 26

## 2025-08-06 PROCEDURE — 76641 ULTRASOUND BREAST COMPLETE: CPT | Mod: 26,50

## 2025-08-06 PROCEDURE — 77067 SCR MAMMO BI INCL CAD: CPT | Mod: 26

## 2025-08-06 PROCEDURE — 77063 BREAST TOMOSYNTHESIS BI: CPT

## 2025-08-06 PROCEDURE — 76641 ULTRASOUND BREAST COMPLETE: CPT

## 2025-08-21 ENCOUNTER — RESULT REVIEW (OUTPATIENT)
Age: 75
End: 2025-08-21

## 2025-08-21 ENCOUNTER — APPOINTMENT (OUTPATIENT)
Dept: FAMILY MEDICINE | Facility: CLINIC | Age: 75
End: 2025-08-21
Payer: MEDICARE

## 2025-08-21 VITALS
SYSTOLIC BLOOD PRESSURE: 142 MMHG | RESPIRATION RATE: 16 BRPM | WEIGHT: 169 LBS | TEMPERATURE: 97.2 F | OXYGEN SATURATION: 95 % | BODY MASS INDEX: 30.91 KG/M2 | DIASTOLIC BLOOD PRESSURE: 84 MMHG | HEART RATE: 94 BPM

## 2025-08-21 VITALS — SYSTOLIC BLOOD PRESSURE: 136 MMHG | DIASTOLIC BLOOD PRESSURE: 82 MMHG

## 2025-08-21 DIAGNOSIS — R19.05 PERIUMBILIC SWELLING, MASS OR LUMP: ICD-10-CM

## 2025-08-21 DIAGNOSIS — R10.33 PERIUMBILICAL PAIN: ICD-10-CM

## 2025-08-21 DIAGNOSIS — L23.7 ALLERGIC CONTACT DERMATITIS DUE TO PLANTS, EXCEPT FOOD: ICD-10-CM

## 2025-08-21 PROCEDURE — 99215 OFFICE O/P EST HI 40 MIN: CPT

## 2025-08-21 PROCEDURE — G2211 COMPLEX E/M VISIT ADD ON: CPT

## 2025-08-21 RX ORDER — PREDNISONE 20 MG/1
20 TABLET ORAL DAILY
Qty: 5 | Refills: 0 | Status: ACTIVE | COMMUNITY
Start: 2025-08-21 | End: 1900-01-01

## 2025-08-27 ENCOUNTER — OUTPATIENT (OUTPATIENT)
Dept: OUTPATIENT SERVICES | Facility: HOSPITAL | Age: 75
LOS: 1 days | End: 2025-08-27
Payer: MEDICARE

## 2025-08-27 ENCOUNTER — APPOINTMENT (OUTPATIENT)
Dept: CT IMAGING | Facility: HOSPITAL | Age: 75
End: 2025-08-27
Payer: MEDICARE

## 2025-08-27 DIAGNOSIS — Z98.890 OTHER SPECIFIED POSTPROCEDURAL STATES: Chronic | ICD-10-CM

## 2025-08-27 DIAGNOSIS — Z98.82 BREAST IMPLANT STATUS: Chronic | ICD-10-CM

## 2025-08-27 DIAGNOSIS — R10.33 PERIUMBILICAL PAIN: ICD-10-CM

## 2025-08-27 DIAGNOSIS — R19.05 PERIUMBILIC SWELLING, MASS OR LUMP: ICD-10-CM

## 2025-08-27 PROCEDURE — 74177 CT ABD & PELVIS W/CONTRAST: CPT

## 2025-08-27 PROCEDURE — 74177 CT ABD & PELVIS W/CONTRAST: CPT | Mod: 26

## 2025-08-28 DIAGNOSIS — I70.90 UNSPECIFIED ATHEROSCLEROSIS: ICD-10-CM

## (undated) DEVICE — FORCEP RADIAL JAW 4 240CM DISP

## (undated) DEVICE — Device

## (undated) DEVICE — KIT ENDO PROCEDURE CUST W/VLV

## (undated) DEVICE — SOL IRR POUR H2O 1000ML

## (undated) DEVICE — ENDOCUFF VISION SZ 2 LG GRN

## (undated) DEVICE — TUBING CAP SET ERBEFLO CLEVERCAP HYBRID CO2 FOR OLYMPUS SCOPES AND UCR